# Patient Record
Sex: MALE | Race: WHITE | NOT HISPANIC OR LATINO
[De-identification: names, ages, dates, MRNs, and addresses within clinical notes are randomized per-mention and may not be internally consistent; named-entity substitution may affect disease eponyms.]

---

## 2019-07-26 PROBLEM — Z00.00 ENCOUNTER FOR PREVENTIVE HEALTH EXAMINATION: Status: ACTIVE | Noted: 2019-07-26

## 2019-09-10 ENCOUNTER — APPOINTMENT (OUTPATIENT)
Dept: NEPHROLOGY | Facility: CLINIC | Age: 74
End: 2019-09-10

## 2019-09-23 ENCOUNTER — APPOINTMENT (OUTPATIENT)
Dept: CARDIOLOGY | Facility: CLINIC | Age: 74
End: 2019-09-23
Payer: MEDICARE

## 2019-09-23 VITALS
SYSTOLIC BLOOD PRESSURE: 140 MMHG | HEIGHT: 72 IN | BODY MASS INDEX: 26.01 KG/M2 | DIASTOLIC BLOOD PRESSURE: 70 MMHG | OXYGEN SATURATION: 98 % | HEART RATE: 101 BPM | WEIGHT: 192 LBS

## 2019-09-23 DIAGNOSIS — I45.10 UNSPECIFIED RIGHT BUNDLE-BRANCH BLOCK: ICD-10-CM

## 2019-09-23 DIAGNOSIS — G43.909 MIGRAINE, UNSPECIFIED, NOT INTRACTABLE, W/OUT STATUS MIGRAINOSUS: ICD-10-CM

## 2019-09-23 DIAGNOSIS — Z86.79 PERSONAL HISTORY OF OTHER DISEASES OF THE CIRCULATORY SYSTEM: ICD-10-CM

## 2019-09-23 DIAGNOSIS — Z86.39 PERSONAL HISTORY OF OTHER ENDOCRINE, NUTRITIONAL AND METABOLIC DISEASE: ICD-10-CM

## 2019-09-23 DIAGNOSIS — Z87.09 PERSONAL HISTORY OF OTHER DISEASES OF THE RESPIRATORY SYSTEM: ICD-10-CM

## 2019-09-23 DIAGNOSIS — Z86.69 PERSONAL HISTORY OF OTHER DISEASES OF THE NERVOUS SYSTEM AND SENSE ORGANS: ICD-10-CM

## 2019-09-23 DIAGNOSIS — Z87.448 PERSONAL HISTORY OF OTHER DISEASES OF URINARY SYSTEM: ICD-10-CM

## 2019-09-23 PROCEDURE — 99205 OFFICE O/P NEW HI 60 MIN: CPT

## 2019-09-23 PROCEDURE — 93000 ELECTROCARDIOGRAM COMPLETE: CPT

## 2019-09-24 ENCOUNTER — NON-APPOINTMENT (OUTPATIENT)
Age: 74
End: 2019-09-24

## 2019-09-24 PROBLEM — I45.10 RIGHT BUNDLE BRANCH BLOCK: Status: RESOLVED | Noted: 2019-09-24 | Resolved: 2019-09-24

## 2019-09-24 PROBLEM — Z86.39 HISTORY OF DIABETES MELLITUS: Status: RESOLVED | Noted: 2019-09-24 | Resolved: 2019-09-24

## 2019-09-24 PROBLEM — Z87.448 HISTORY OF NEUROGENIC BLADDER: Status: RESOLVED | Noted: 2019-09-24 | Resolved: 2019-09-24

## 2019-09-24 PROBLEM — Z86.69 HISTORY OF SEIZURE DISORDER: Status: RESOLVED | Noted: 2019-09-24 | Resolved: 2019-09-24

## 2019-09-24 PROBLEM — Z87.09 HISTORY OF ASTHMA: Status: RESOLVED | Noted: 2019-09-24 | Resolved: 2019-09-24

## 2019-09-24 PROBLEM — G43.909 MIGRAINE HEADACHE: Status: RESOLVED | Noted: 2019-09-24 | Resolved: 2019-09-24

## 2019-09-24 PROBLEM — Z86.79 HISTORY OF HYPERTENSION: Status: RESOLVED | Noted: 2019-09-24 | Resolved: 2019-09-24

## 2019-09-24 NOTE — REVIEW OF SYSTEMS
[see HPI] : see HPI [Feeling Fatigued] : feeling fatigued [Chest Pain] : chest pain [Joint Pain] : joint pain [Anxiety] : anxiety [Negative] : Heme/Lymph

## 2019-09-29 RX ORDER — BUPROPION HYDROCHLORIDE 75 MG/1
TABLET, FILM COATED ORAL
Refills: 0 | Status: ACTIVE | COMMUNITY

## 2019-09-29 RX ORDER — CLONAZEPAM 2 MG/1
TABLET ORAL
Refills: 0 | Status: ACTIVE | COMMUNITY

## 2019-09-29 RX ORDER — SUMATRIPTAN SUCCINATE 100 MG/1
TABLET, FILM COATED ORAL
Refills: 0 | Status: ACTIVE | COMMUNITY

## 2019-09-29 NOTE — PHYSICAL EXAM
[Normal Conjunctiva] : the conjunctiva exhibited no abnormalities [Edema] : no peripheral edema present [Heart Sounds] : normal S1 and S2 [Auscultation Breath Sounds / Voice Sounds] : lungs were clear to auscultation bilaterally [Bowel Sounds] : normal bowel sounds [Abdomen Soft] : soft [Abnormal Walk] : normal gait [Abdomen Tenderness] : non-tender [] : no rash [Affect] : the affect was normal [FreeTextEntry1] : pleasant

## 2019-09-29 NOTE — DISCUSSION/SUMMARY
[FreeTextEntry1] : Chest pain\par The working diagnosis is musculoskeletal chest pain. The history is consistent with this diagnosis. The differential diagnosis would include myocardial ischemia/atherosclerotic heart disease. The patient has multiple risk factors for the development of atherosclerotic disease including diabetes age hyperlipidemia and hypertension. The resting 9/19 electrocardiogram is abnormal. Noninvasive evaluation for coronary disease is indicated. The patient's inability to exercise secondary to a gait disorder and peripheral neuropathy makes a provocative nuclear study most attractive.\par \par I have recommended the following:\par 1. Risk factor modification\par 2. Lexiscan sestamibi study\par \par Valvular heart disease\par The 8/19 echocardiogram was technically difficult. The heart appeared to be rotated laterally and posteriorly. Mild-moderate aortic stenosis was noted with a peak gradient of 18 mmHg , mean gradient of 10 mmHg and an aortic valve area of 1.0 cm². The mitral valve leaflets were thickened with trace mitral regurgitation. Mild pulmonary hypertension was noted with a pulmonary artery systolic pressure of 33-38 mmHg. Mild left ventricular diastolic dysfunction and left ventricular hypertrophy were seen. The left ventricular ejection fraction was 65-70%. The present cardiac physical examination is not suggestive of severe aortic stenosis. In view of the lack of symptoms, absence of heart failure and clinical course continued monitoring without intervention is indicated at this time.\par \par I have recommended the following:\par 1 no further cardiac testing for this problem at this time\par 2 repeat echocardiogram in one year\par \par \par \par Hypertension\par Hypertension is controlled on the present medical regimen. In the setting of diabetes ACE-I/ARB therapy is attractive. In view of previous development of severe hyponatremia attributed to diuretic administration., diuretics should be avoided. \par \par I have recommended the following: \par 1. Low-salt low-fat low-cholesterol diabetic diet. Exercise to the extent possible\par 2. Continue the present medical regimen\par \par \par Hyperlipidemia\par Hyperlipidemia represents a risk factor for the development of atherosclerotic heart disease. The target LDL level for primary prevention is about 100.  The most recent lipid levels are not available for review. Non-pharmacological therapy, specifically diet and exercise are emphasizes major aspects of treatment. \par \par I have recommended the following:\par 1. Low-salt low-fat low-cholesterol diabetic diet. Regular aerobic exercise to the extent possible\par 2. Target LDL level to about 100 as discussed above\par 3. Routine laboratory studies including lipid profile through primary care\par \par \par \par \par Right bundle branch block\par The presence of a right bundle branch block and left axis deviation are consistent with significant conduction system disease.  Permanent pacemaker implantation is not indicated in the absence of symptomatic bradycardia or high degree AV block\par \par I have  recommended the following:\par 1 no further cardiac testing for this problem at this time.

## 2019-09-29 NOTE — HISTORY OF PRESENT ILLNESS
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 There is no history of a myocardial infarction or congestive heart failure.\par \par Mathew complains of an anterior chest "twinge" which occurs at rest without associated diaphoresis nausea or dyspnea. His activity is largely limited by peripheral neuropathy attributed to long-standing diabetes. There is a prior history of hypertension.\par \par An 8/19 echocardiogram demonstrated mild aortic stenosis and trace mitral regurgitation. The left ventricular ejection fraction was 65-70%.\par \par Mr. Saavedra is requesting a referral for a primary care physician. I have recommended Dr. MELITON Grijalva\par \par Mathew presents today for cardiovascular evaluation.

## 2019-10-25 ENCOUNTER — APPOINTMENT (OUTPATIENT)
Dept: CARDIOLOGY | Facility: CLINIC | Age: 74
End: 2019-10-25
Payer: MEDICARE

## 2019-10-25 ENCOUNTER — NON-APPOINTMENT (OUTPATIENT)
Age: 74
End: 2019-10-25

## 2019-10-25 ENCOUNTER — RX RENEWAL (OUTPATIENT)
Age: 74
End: 2019-10-25

## 2019-10-25 VITALS
SYSTOLIC BLOOD PRESSURE: 190 MMHG | BODY MASS INDEX: 25.77 KG/M2 | HEART RATE: 81 BPM | DIASTOLIC BLOOD PRESSURE: 104 MMHG | OXYGEN SATURATION: 98 % | WEIGHT: 190 LBS

## 2019-10-25 DIAGNOSIS — Z82.3 FAMILY HISTORY OF STROKE: ICD-10-CM

## 2019-10-25 PROCEDURE — 93000 ELECTROCARDIOGRAM COMPLETE: CPT

## 2019-10-25 PROCEDURE — 99215 OFFICE O/P EST HI 40 MIN: CPT

## 2019-10-26 PROBLEM — Z82.3 FAMILY HISTORY OF CEREBROVASCULAR ACCIDENT (CVA): Status: ACTIVE | Noted: 2019-10-26

## 2019-10-26 LAB
ANION GAP SERPL CALC-SCNC: 17 MMOL/L
BASOPHILS # BLD AUTO: 0.1 K/UL
BASOPHILS NFR BLD AUTO: 1.3 %
BUN SERPL-MCNC: 20 MG/DL
CALCIUM SERPL-MCNC: 10.3 MG/DL
CHLORIDE SERPL-SCNC: 94 MMOL/L
CO2 SERPL-SCNC: 28 MMOL/L
CREAT SERPL-MCNC: 0.95 MG/DL
EOSINOPHIL # BLD AUTO: 0.23 K/UL
EOSINOPHIL NFR BLD AUTO: 3 %
GLUCOSE SERPL-MCNC: 115 MG/DL
HCT VFR BLD CALC: 42.9 %
HGB BLD-MCNC: 14.1 G/DL
IMM GRANULOCYTES NFR BLD AUTO: 0.3 %
LYMPHOCYTES # BLD AUTO: 1.35 K/UL
LYMPHOCYTES NFR BLD AUTO: 17.6 %
MAN DIFF?: NORMAL
MCHC RBC-ENTMCNC: 30.5 PG
MCHC RBC-ENTMCNC: 32.9 GM/DL
MCV RBC AUTO: 92.7 FL
MONOCYTES # BLD AUTO: 0.51 K/UL
MONOCYTES NFR BLD AUTO: 6.7 %
NEUTROPHILS # BLD AUTO: 5.44 K/UL
NEUTROPHILS NFR BLD AUTO: 71.1 %
PLATELET # BLD AUTO: 227 K/UL
POTASSIUM SERPL-SCNC: 4.9 MMOL/L
RBC # BLD: 4.63 M/UL
RBC # FLD: 12.1 %
SODIUM SERPL-SCNC: 138 MMOL/L
WBC # FLD AUTO: 7.65 K/UL

## 2019-10-26 RX ORDER — ATORVASTATIN CALCIUM 80 MG/1
TABLET, FILM COATED ORAL
Refills: 0 | Status: ACTIVE | COMMUNITY

## 2019-10-26 RX ORDER — OXCARBAZEPINE 150 MG/1
TABLET, FILM COATED ORAL
Refills: 0 | Status: ACTIVE | COMMUNITY
Start: 2019-08-27

## 2019-10-26 NOTE — REVIEW OF SYSTEMS
[Feeling Fatigued] : feeling fatigued [see HPI] : see HPI [Chest Pain] : chest pain [Joint Pain] : joint pain [Anxiety] : anxiety [Negative] : Heme/Lymph

## 2019-10-26 NOTE — DISCUSSION/SUMMARY
[FreeTextEntry1] : Chest pain\par The working diagnosis is musculoskeletal chest pain. The history is consistent with this diagnosis. The differential diagnosis would include myocardial ischemia/atherosclerotic heart disease. The patient has multiple risk factors for the development of atherosclerotic disease including diabetes age hyperlipidemia and hypertension. The resting  10/19 electrocardiogram is abnormal. Noninvasive evaluation for coronary disease is indicated. The patient's inability to exercise secondary to a gait disorder and peripheral neuropathy makes a provocative nuclear study most attractive.\par \par I have recommended the following:\par 1. Risk factor modification\par 2. Lexiscan sestamibi study\par \par \par \par Valvular heart disease\par The 8/19 echocardiogram was technically difficult. The heart appeared to be rotated laterally and posteriorly. Mild-moderate aortic stenosis was noted with a peak gradient of 18 mmHg , mean gradient of 10 mmHg and an aortic valve area of 1.0 cm². The mitral valve leaflets were thickened with trace mitral regurgitation. Mild pulmonary hypertension was noted with a pulmonary artery systolic pressure of 33-38 mmHg. Mild left ventricular diastolic dysfunction and left ventricular hypertrophy were seen. The left ventricular ejection fraction was 65-70%. The present cardiac physical examination is not suggestive of severe aortic stenosis. In view of the lack of symptoms, absence of heart failure and clinical course continued monitoring without intervention is indicated at this time.\par \par I have recommended the following:\par 1 no further cardiac testing for this problem at this time\par 2 repeat echocardiogram in one year\par \par \par \par Hypertension\par Hypertension is not  controlled on the present medical regimen. In the setting of diabetes ACE-I/ARB therapy is attractive. In view of previous development of severe hyponatremia attributed to diuretic administration., diuretics should be avoided. \par \par I have recommended the following: \par 1. Low-salt low-fat low-cholesterol diabetic diet. Exercise to the extent possible\par 2. Increase Benazepril  to 40 mg/day\par 3. add amlodipine 5 mg/day with further dose adjustment dictated by tolerance and response\par 4 home blood pressure monitoring\par 5. Neurology reevaluation Dr. Penaloza\par 6 laboratory studies including electrolytes and renal function are ordered\par 7 emergency room evaluation/medical records not available at this time requested for review\par \par Hyperlipidemia\par Hyperlipidemia represents a risk factor for the development of atherosclerotic heart disease. The target LDL level for primary prevention is about 100.  The most recent lipid levels are not available for review. Non-pharmacological therapy, specifically diet and exercise are emphasized as major aspects of treatment. \par \par I have recommended the following:\par 1. Low-salt low-fat low-cholesterol diabetic diet. Regular aerobic exercise to the extent possible\par 2. Target LDL level to about 100 as discussed above\par 3. Routine laboratory studies including lipid profile through primary care\par \par \par \par \par Right bundle branch block\par The presence of a right bundle branch block and left axis deviation are consistent with significant conduction system disease.  Permanent pacemaker implantation is not indicated in the absence of symptomatic bradycardia or high degree AV block\par \par I have  recommended the following:\par 1 no further cardiac testing for this problem at this time.

## 2019-10-26 NOTE — HISTORY OF PRESENT ILLNESS
[FreeTextEntry1] : 74-year-old man\par Unscheduled visit\par Mathew called with symptoms of a headache and elevated blood pressure levels to as high as 205/107. He was evaluated in the [facility] emergency room for similar complaints 2-3 days ago. CAT scan of the brain and blood tests were reportedly normal. The details/medical records are not available for review. Mr. Saavedra complains of chest discomfort described as a "twinge" at rest. In addition he states that he feels confused and forgetful.

## 2019-10-26 NOTE — PHYSICAL EXAM
[Normal Conjunctiva] : the conjunctiva exhibited no abnormalities [Auscultation Breath Sounds / Voice Sounds] : lungs were clear to auscultation bilaterally [Heart Sounds] : normal S1 and S2 [Edema] : no peripheral edema present [Bowel Sounds] : normal bowel sounds [Abdomen Soft] : soft [Abdomen Tenderness] : non-tender [Abnormal Walk] : normal gait [] : no rash [Affect] : the affect was normal [FreeTextEntry1] : pleasant

## 2019-11-08 ENCOUNTER — RESULT REVIEW (OUTPATIENT)
Age: 74
End: 2019-11-08

## 2019-11-13 ENCOUNTER — MEDICATION RENEWAL (OUTPATIENT)
Age: 74
End: 2019-11-13

## 2019-11-13 RX ORDER — CLONIDINE HYDROCHLORIDE 0.3 MG/1
TABLET ORAL
Refills: 0 | Status: DISCONTINUED | COMMUNITY
End: 2019-11-13

## 2019-12-27 ENCOUNTER — RX RENEWAL (OUTPATIENT)
Age: 74
End: 2019-12-27

## 2020-03-05 RX ORDER — BENAZEPRIL HYDROCHLORIDE 5 MG/1
TABLET ORAL
Refills: 0 | Status: DISCONTINUED | COMMUNITY
End: 2020-03-05

## 2020-06-19 ENCOUNTER — APPOINTMENT (OUTPATIENT)
Dept: CARDIOLOGY | Facility: CLINIC | Age: 75
End: 2020-06-19

## 2020-06-22 ENCOUNTER — NON-APPOINTMENT (OUTPATIENT)
Age: 75
End: 2020-06-22

## 2020-06-22 ENCOUNTER — APPOINTMENT (OUTPATIENT)
Dept: CARDIOLOGY | Facility: CLINIC | Age: 75
End: 2020-06-22
Payer: MEDICARE

## 2020-06-22 VITALS
OXYGEN SATURATION: 98 % | HEART RATE: 63 BPM | DIASTOLIC BLOOD PRESSURE: 90 MMHG | SYSTOLIC BLOOD PRESSURE: 142 MMHG | BODY MASS INDEX: 25.77 KG/M2 | WEIGHT: 190 LBS

## 2020-06-22 DIAGNOSIS — M50.90 CERVICAL DISC DISORDER, UNSPECIFIED, UNSPECIFIED CERVICAL REGION: ICD-10-CM

## 2020-06-22 DIAGNOSIS — Z86.59 PERSONAL HISTORY OF OTHER MENTAL AND BEHAVIORAL DISORDERS: ICD-10-CM

## 2020-06-22 DIAGNOSIS — Z87.09 PERSONAL HISTORY OF OTHER DISEASES OF THE RESPIRATORY SYSTEM: ICD-10-CM

## 2020-06-22 DIAGNOSIS — Z87.19 PERSONAL HISTORY OF OTHER DISEASES OF THE DIGESTIVE SYSTEM: ICD-10-CM

## 2020-06-22 DIAGNOSIS — Z63.5 DISRUPTION OF FAMILY BY SEPARATION AND DIVORCE: ICD-10-CM

## 2020-06-22 DIAGNOSIS — Z87.438 PERSONAL HISTORY OF OTHER DISEASES OF MALE GENITAL ORGANS: ICD-10-CM

## 2020-06-22 PROCEDURE — 99215 OFFICE O/P EST HI 40 MIN: CPT

## 2020-06-22 PROCEDURE — 93000 ELECTROCARDIOGRAM COMPLETE: CPT

## 2020-06-22 SDOH — SOCIAL STABILITY - SOCIAL INSECURITY: DISRUPTION OF FAMILY BY SEPARATION AND DIVORCE: Z63.5

## 2020-06-23 PROBLEM — Z87.09 HISTORY OF SINUSITIS: Status: RESOLVED | Noted: 2020-06-23 | Resolved: 2020-06-23

## 2020-06-23 PROBLEM — Z87.19 HISTORY OF HIATAL HERNIA: Status: RESOLVED | Noted: 2020-06-23 | Resolved: 2020-06-23

## 2020-06-23 PROBLEM — Z87.438 HISTORY OF BENIGN PROSTATIC HYPERPLASIA: Status: RESOLVED | Noted: 2019-09-24 | Resolved: 2020-06-23

## 2020-06-23 PROBLEM — Z63.5 DIVORCED: Status: ACTIVE | Noted: 2020-06-23

## 2020-06-23 PROBLEM — Z87.19 HISTORY OF BARRETT'S ESOPHAGUS: Status: RESOLVED | Noted: 2019-09-24 | Resolved: 2020-06-23

## 2020-06-23 PROBLEM — Z86.59 HISTORY OF DEPRESSION: Status: RESOLVED | Noted: 2019-09-24 | Resolved: 2020-06-23

## 2020-06-23 PROBLEM — M50.90 CERVICAL DISC DISEASE: Status: RESOLVED | Noted: 2020-06-23 | Resolved: 2020-06-23

## 2020-06-25 NOTE — PHYSICAL EXAM
[Normal Conjunctiva] : the conjunctiva exhibited no abnormalities [Auscultation Breath Sounds / Voice Sounds] : lungs were clear to auscultation bilaterally [Edema] : no peripheral edema present [Heart Sounds] : normal S1 and S2 [Abdomen Soft] : soft [Bowel Sounds] : normal bowel sounds [Abdomen Tenderness] : non-tender [Abnormal Walk] : normal gait [] : no rash [Affect] : the affect was normal [FreeTextEntry1] : pleasant

## 2020-06-25 NOTE — HISTORY OF PRESENT ILLNESS
[FreeTextEntry1] : 74-year-old man\par Unscheduled visit\par Mathew reports an episode of severe lightheadedness one day after swimming in cold water one week ago. In association with this he felt an irregular pulse. No chest pain. No shortness of breath. No syncope. There has been no recurrence. At the present time he feels well.

## 2020-06-25 NOTE — DISCUSSION/SUMMARY
[FreeTextEntry1] : Palpitations/dizziness\par The working diagnosis is atrial ectopy. The history is consistent with this diagnosis. The differential diagnoses would include a  nonsustained atrial arrhythmia. In the setting of normal left ventricular systolic function (8/19 echocardiogram left ventricular ejection fraction 65-70%) the risk for development of malignant ventricular arrhythmia is low. An electrocardiogram during symptoms will be most helpful for diagnosis. The degree of aortic stenosis does not appear to be severe enough to account for the patient's symptoms\par \par \par I have recommended the following:\par 1.Zio patch 2  week event recorder\par 2 echocardiogram\par \par \par \par \par Valvular heart  disease\par The 8/19 echocardiogram was technically difficult. The heart appeared to be rotated laterally and posteriorly. Mild-moderate aortic stenosis was noted with a peak gradient of 18 mmHg , mean gradient of 10 mmHg and an aortic valve area of 1.0 cm². The mitral valve leaflets were thickened with trace mitral regurgitation. Mild pulmonary hypertension was noted with a pulmonary artery systolic pressure of 33-38 mmHg. Mild left ventricular diastolic dysfunction and left ventricular hypertrophy were seen. The left ventricular ejection fraction was 65-70%. The present cardiac physical examination is not suggestive of severe aortic stenosis.Echocardiography would be helpful to reassess left ventricular and valvular function..\par \par I have recommended the following:\par 1  echocardiogram\par \par \par \par Hypertension\par Hypertension is  reportedly been controlled on the present medical regimen.Elevation of the blood pressure on initial examination today ( 142/90) may in part be related to a white coat effect. In the setting of diabetes ACE-I/ARB therapy is attractive. In view of previous development of severe hyponatremia attributed to diuretic administration., diuretics should be avoided. \par \par I have recommended the following: \par 1. Low-salt low-fat low-cholesterol diabetic diet. Exercise to the extent possible\par 2. Continue the present medical regimen\par 3 home blood pressure monitoring\par \par \par \par Hyperlipidemia\par Hyperlipidemia represents a risk factor for the development of atherosclerotic heart disease. There is however no evidence of such to date. An  11/19 lexiscan sestamibi study was normal. The left ventricular ejection fraction was 63% The target LDL level for primary prevention is about 100.  The most recent lipid levels are not available for review. Non-pharmacological therapy, specifically diet and exercise are emphasized as major aspects of treatment. \par \par I have recommended the following:\par 1. Low-salt low-fat low-cholesterol diabetic diet. Regular aerobic exercise to the extent possible\par 2. Target LDL level to about 100 as discussed above\par 3. Routine laboratory studies including lipid profile through primary care Dr. Grijalva\par \par \par \par \par Right bundle branch block\par The presence of a right bundle branch block and left axis deviation are consistent with significant conduction system disease.  Permanent pacemaker implantation is not indicated in the absence of symptomatic bradycardia or high degree AV block\par \par I have  recommended the following:\par 1 no further cardiac testing for this problem at this time.\par \par \par \par The diagnosis, prognosis, risks, options and alternatives were explained at length to the patient. All questions were answered. Issues discussed included hypertension hyperlipidemia dizziness rhythm disturbances atherosclerotic heart disease valvular heart disease and in particular aortic valve stenosis.\par \par Counseling and/or coordination of care\par Time was a significant factor for this patient encounter. Total time spent with the patient was 40 minutes. 50% of the time was devoted to counseling and/or coordination of care

## 2020-07-02 ENCOUNTER — APPOINTMENT (OUTPATIENT)
Dept: CARDIOLOGY | Facility: CLINIC | Age: 75
End: 2020-07-02
Payer: MEDICARE

## 2020-07-02 PROCEDURE — 0296T: CPT

## 2020-07-14 ENCOUNTER — RESULT REVIEW (OUTPATIENT)
Age: 75
End: 2020-07-14

## 2020-08-03 PROCEDURE — 93227 XTRNL ECG REC<48 HR R&I: CPT

## 2020-08-04 ENCOUNTER — APPOINTMENT (OUTPATIENT)
Dept: CARDIOLOGY | Facility: CLINIC | Age: 75
End: 2020-08-04

## 2020-08-10 ENCOUNTER — APPOINTMENT (OUTPATIENT)
Dept: CARDIOLOGY | Facility: CLINIC | Age: 75
End: 2020-08-10

## 2020-09-19 ENCOUNTER — TRANSCRIPTION ENCOUNTER (OUTPATIENT)
Age: 75
End: 2020-09-19

## 2020-11-02 ENCOUNTER — RX RENEWAL (OUTPATIENT)
Age: 75
End: 2020-11-02

## 2020-11-04 ENCOUNTER — RX RENEWAL (OUTPATIENT)
Age: 75
End: 2020-11-04

## 2020-12-07 ENCOUNTER — APPOINTMENT (OUTPATIENT)
Dept: CARDIOLOGY | Facility: CLINIC | Age: 75
End: 2020-12-07
Payer: MEDICARE

## 2020-12-07 PROCEDURE — 93306 TTE W/DOPPLER COMPLETE: CPT

## 2020-12-17 ENCOUNTER — RX RENEWAL (OUTPATIENT)
Age: 75
End: 2020-12-17

## 2021-06-10 ENCOUNTER — APPOINTMENT (OUTPATIENT)
Dept: HEMATOLOGY ONCOLOGY | Facility: CLINIC | Age: 76
End: 2021-06-10
Payer: MEDICARE

## 2021-06-10 VITALS
DIASTOLIC BLOOD PRESSURE: 78 MMHG | WEIGHT: 184 LBS | HEIGHT: 72 IN | RESPIRATION RATE: 18 BRPM | TEMPERATURE: 98.2 F | HEART RATE: 73 BPM | BODY MASS INDEX: 24.92 KG/M2 | SYSTOLIC BLOOD PRESSURE: 160 MMHG | OXYGEN SATURATION: 97 %

## 2021-06-10 DIAGNOSIS — Z86.69 PERSONAL HISTORY OF OTHER DISEASES OF THE NERVOUS SYSTEM AND SENSE ORGANS: ICD-10-CM

## 2021-06-10 DIAGNOSIS — Z80.8 FAMILY HISTORY OF MALIGNANT NEOPLASM OF OTHER ORGANS OR SYSTEMS: ICD-10-CM

## 2021-06-10 PROCEDURE — 99205 OFFICE O/P NEW HI 60 MIN: CPT

## 2021-06-10 RX ORDER — CLONIDINE HYDROCHLORIDE 0.1 MG/1
0.1 TABLET ORAL DAILY
Qty: 90 | Refills: 0 | Status: DISCONTINUED | COMMUNITY
Start: 2019-11-13 | End: 2021-06-10

## 2021-07-01 LAB
ALBUMIN MFR SERPL ELPH: 66.8 %
ALBUMIN SERPL ELPH-MCNC: 4.8 G/DL
ALBUMIN SERPL-MCNC: 4.4 G/DL
ALBUMIN/GLOB SERPL: 2 RATIO
ALP BLD-CCNC: 93 U/L
ALPHA1 GLOB MFR SERPL ELPH: 3.9 %
ALPHA1 GLOB SERPL ELPH-MCNC: 0.3 G/DL
ALPHA2 GLOB MFR SERPL ELPH: 9.3 %
ALPHA2 GLOB SERPL ELPH-MCNC: 0.6 G/DL
ALT SERPL-CCNC: 29 U/L
ANA SER IF-ACNC: NEGATIVE
ANION GAP SERPL CALC-SCNC: 19 MMOL/L
AST SERPL-CCNC: 33 U/L
B-GLOBULIN MFR SERPL ELPH: 10.3 %
B-GLOBULIN SERPL ELPH-MCNC: 0.7 G/DL
BILIRUB SERPL-MCNC: 0.3 MG/DL
BUN SERPL-MCNC: 27 MG/DL
CALCIUM SERPL-MCNC: 9.7 MG/DL
CHLORIDE SERPL-SCNC: 97 MMOL/L
CO2 SERPL-SCNC: 26 MMOL/L
CREAT SERPL-MCNC: 1.05 MG/DL
DEPRECATED KAPPA LC FREE/LAMBDA SER: 1.36 RATIO
DEPRECATED KAPPA LC FREE/LAMBDA SER: 1.36 RATIO
EPO SERPL-MCNC: 12.7 MIU/ML
FERRITIN SERPL-MCNC: 109 NG/ML
FOLATE SERPL-MCNC: >20 NG/ML
GAMMA GLOB FLD ELPH-MCNC: 0.6 G/DL
GAMMA GLOB MFR SERPL ELPH: 9.7 %
GLUCOSE SERPL-MCNC: 169 MG/DL
HAPTOGLOB SERPL-MCNC: 96 MG/DL
IGA SER QL IEP: 176 MG/DL
IGG SER QL IEP: 616 MG/DL
IGM SER QL IEP: 71 MG/DL
INTERPRETATION SERPL IEP-IMP: NORMAL
IRON SATN MFR SERPL: 27 %
IRON SERPL-MCNC: 74 UG/DL
KAPPA LC CSF-MCNC: 1.75 MG/DL
KAPPA LC CSF-MCNC: 1.75 MG/DL
KAPPA LC SERPL-MCNC: 2.38 MG/DL
KAPPA LC SERPL-MCNC: 2.38 MG/DL
LDH SERPL-CCNC: 242 U/L
M PROTEIN SPEC IFE-MCNC: NORMAL
MAGNESIUM SERPL-MCNC: 1.8 MG/DL
METHYLMALONATE SERPL-SCNC: 266 NMOL/L
PHOSPHATE SERPL-MCNC: 3.1 MG/DL
POTASSIUM SERPL-SCNC: 5.2 MMOL/L
PROT SERPL-MCNC: 6.6 G/DL
RHEUMATOID FACT SER QL: 14 IU/ML
SODIUM SERPL-SCNC: 142 MMOL/L
TIBC SERPL-MCNC: 272 UG/DL
TSH SERPL-ACNC: 2.35 UIU/ML
UIBC SERPL-MCNC: 198 UG/DL
VIT B12 SERPL-MCNC: 772 PG/ML

## 2021-07-01 NOTE — HISTORY OF PRESENT ILLNESS
[de-identified] : Mr. Saavedra is referred  by Dr.Jack Álvarez for second opinion for history of anemia.\par He reports having longstanding history of iron deficiency anemia which he states is an issue even when he his irons are normal.  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  He has undergone GI evaluation with endoscopy, capsule endoscopy and colonoscopy which failed to show areas of bleeding.  Most recent CBC on 4/5/2021 reveals a hemoglobin 11.7 hematocrit of 36.7 MCV of 95.8 and MCH of 30.5.  His ferritin was 161 and percent saturation was 31.  He reports feeling weak and tired.  He states that in the past he underwent bone marrow biopsy however the results were not available at this time\par h/o low iron received IV iron- loses iron replaced then shoots up \par pt of  (didn't ask questions about why iron drops)? 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 beta lymphocutosis\par BM biopsy by  "marrow like [place]." [0 - No Distress] : Distress Level: 0

## 2021-07-01 NOTE — REVIEW OF SYSTEMS
[Fatigue] : fatigue [Palpitations] : palpitations [Difficulty Walking] : difficulty walking [Negative] : Allergic/Immunologic [de-identified] : Peripheral neuropathy, tremors

## 2021-07-01 NOTE — REASON FOR VISIT
[Initial Consultation] : an initial consultation for [FreeTextEntry2] : Anemia, monoclonal B-cell lymphocytosis

## 2021-07-01 NOTE — ASSESSMENT
[FreeTextEntry1] : This appears to be a relatively chronic and progressive finding. \par I will obtain a CBC with differential and a peripheral blood smear review. I will obtain a complete hematological workup to include a CMP, LDH, haptoglobin, reticulocyte count, B12/MMA/folic acid levels, TSH, SPEP, SIEP, IgQ,serum free light chains, HERNANDEZ,  RF, panel, ferritin level, and a Digna' test. \par The patient will return in 4 weeks for followup, review of the above workup, and further recommendations.  \par \par Thank you very much for allowing me to participate in the care of this patient. Should you have any questions or concerns please do not hesitate to call me directly.

## 2021-07-14 ENCOUNTER — NON-APPOINTMENT (OUTPATIENT)
Age: 76
End: 2021-07-14

## 2021-07-14 ENCOUNTER — APPOINTMENT (OUTPATIENT)
Dept: HEMATOLOGY ONCOLOGY | Facility: CLINIC | Age: 76
End: 2021-07-14

## 2021-07-27 ENCOUNTER — APPOINTMENT (OUTPATIENT)
Dept: HEMATOLOGY ONCOLOGY | Facility: CLINIC | Age: 76
End: 2021-07-27

## 2022-09-07 ENCOUNTER — NON-APPOINTMENT (OUTPATIENT)
Age: 77
End: 2022-09-07

## 2022-09-21 ENCOUNTER — NON-APPOINTMENT (OUTPATIENT)
Age: 77
End: 2022-09-21

## 2022-09-21 ENCOUNTER — APPOINTMENT (OUTPATIENT)
Dept: CARDIOLOGY | Facility: CLINIC | Age: 77
End: 2022-09-21

## 2022-09-21 VITALS
WEIGHT: 177 LBS | HEART RATE: 67 BPM | DIASTOLIC BLOOD PRESSURE: 88 MMHG | HEIGHT: 72 IN | OXYGEN SATURATION: 99 % | SYSTOLIC BLOOD PRESSURE: 198 MMHG | BODY MASS INDEX: 23.98 KG/M2

## 2022-09-21 DIAGNOSIS — G61.81 CHRONIC INFLAMMATORY DEMYELINATING POLYNEURITIS: ICD-10-CM

## 2022-09-21 DIAGNOSIS — Z86.59 PERSONAL HISTORY OF OTHER MENTAL AND BEHAVIORAL DISORDERS: ICD-10-CM

## 2022-09-21 DIAGNOSIS — Z86.69 PERSONAL HISTORY OF OTHER DISEASES OF THE NERVOUS SYSTEM AND SENSE ORGANS: ICD-10-CM

## 2022-09-21 DIAGNOSIS — Z86.39 PERSONAL HISTORY OF OTHER ENDOCRINE, NUTRITIONAL AND METABOLIC DISEASE: ICD-10-CM

## 2022-09-21 PROCEDURE — 93000 ELECTROCARDIOGRAM COMPLETE: CPT

## 2022-09-21 PROCEDURE — 99215 OFFICE O/P EST HI 40 MIN: CPT

## 2022-09-21 PROCEDURE — 36415 COLL VENOUS BLD VENIPUNCTURE: CPT

## 2022-09-25 PROBLEM — Z86.39 HISTORY OF HYPERLIPIDEMIA: Status: RESOLVED | Noted: 2019-09-24 | Resolved: 2022-09-25

## 2022-09-25 RX ORDER — OMEPRAZOLE 40 MG/1
CAPSULE, DELAYED RELEASE ORAL
Refills: 0 | Status: ACTIVE | COMMUNITY

## 2022-09-25 RX ORDER — GABAPENTIN 100 MG/1
CAPSULE ORAL
Refills: 0 | Status: ACTIVE | COMMUNITY
Start: 2019-08-05

## 2022-09-25 RX ORDER — LAMOTRIGINE 150 MG/1
TABLET ORAL
Refills: 0 | Status: ACTIVE | COMMUNITY

## 2022-09-25 RX ORDER — CLONIDINE HYDROCHLORIDE 0.3 MG/1
TABLET ORAL
Refills: 0 | Status: ACTIVE | COMMUNITY

## 2022-09-28 LAB
ANION GAP SERPL CALC-SCNC: 10 MMOL/L
BASOPHILS # BLD AUTO: 0.08 K/UL
BASOPHILS NFR BLD AUTO: 1.9 %
BUN SERPL-MCNC: 19 MG/DL
CALCIUM SERPL-MCNC: 9.7 MG/DL
CHLORIDE SERPL-SCNC: 100 MMOL/L
CHOLEST SERPL-MCNC: 188 MG/DL
CO2 SERPL-SCNC: 30 MMOL/L
CREAT SERPL-MCNC: 1.03 MG/DL
EGFR: 75 ML/MIN/1.73M2
EOSINOPHIL # BLD AUTO: 0.18 K/UL
EOSINOPHIL NFR BLD AUTO: 4.2 %
ESTIMATED AVERAGE GLUCOSE: 123 MG/DL
GLUCOSE SERPL-MCNC: 163 MG/DL
HBA1C MFR BLD HPLC: 5.9 %
HCT VFR BLD CALC: 39.7 %
HDLC SERPL-MCNC: 97 MG/DL
HGB BLD-MCNC: 12 G/DL
IMM GRANULOCYTES NFR BLD AUTO: 0.2 %
LDLC SERPL CALC-MCNC: 78 MG/DL
LYMPHOCYTES # BLD AUTO: 0.97 K/UL
LYMPHOCYTES NFR BLD AUTO: 22.9 %
MAN DIFF?: NORMAL
MCHC RBC-ENTMCNC: 27.9 PG
MCHC RBC-ENTMCNC: 30.2 GM/DL
MCV RBC AUTO: 92.3 FL
MONOCYTES # BLD AUTO: 0.33 K/UL
MONOCYTES NFR BLD AUTO: 7.8 %
NEUTROPHILS # BLD AUTO: 2.67 K/UL
NEUTROPHILS NFR BLD AUTO: 63 %
NONHDLC SERPL-MCNC: 91 MG/DL
PLATELET # BLD AUTO: 209 K/UL
POTASSIUM SERPL-SCNC: 5.4 MMOL/L
RBC # BLD: 4.3 M/UL
RBC # FLD: 18.2 %
SODIUM SERPL-SCNC: 139 MMOL/L
TRIGL SERPL-MCNC: 63 MG/DL
WBC # FLD AUTO: 4.24 K/UL

## 2022-09-28 NOTE — REVIEW OF SYSTEMS
[Feeling Fatigued] : feeling fatigued [Joint Pain] : joint pain [Anxiety] : anxiety [Negative] : Heme/Lymph [FreeTextEntry5] : see history of present illness

## 2022-09-28 NOTE — DISCUSSION/SUMMARY
[FreeTextEntry1] : Dizziness/palpitations\par The working diagnosis is atrial ectopy. The history is consistent with this diagnosis. The differential diagnoses would include a  nonsustained atrial arrhythmia. A.7/20 Zio patch 2 day mobile telemetry study reveals sinus rhythm /minute. No arrhythmia was seen.  In the setting of normal left ventricular systolic function (6/21  echocardiogram left ventricular ejection fraction  55-60%) the risk for development of malignant ventricular arrhythmia is low. An electrocardiogram during symptoms will be most helpful for diagnosis. The degree of aortic stenosis does not appear to be severe enough to account for the patient's symptoms\par \par \par I have recommended the following:\par 1. echocardiogram\par \par \par \par \par Atherosclerotic heart disease\par Atherosclerotic heart disease is stable. In 11/19 Lexiscan sestamibi study was normal. The left ventricle ejection fraction was 63%. In 6/21 he was hospitalized following a mechanical fall and had to have elevated troponin level  30 - with questionable T-wave inversions in the lateral leads. He was diagnosed as having a non-STEMI. It is unclear what followup studies if any were done after that hospitalization.\par \par I have recommended the following\par a. Prior cardiac records not available at this time are requested for review\par b. Lexiscan sestamibi study\par \par \par \par Valvular heart  disease\par The 6/21 echocardiogram revealed mild aortic stenosis. The peak gradient was 12 mmHg mean gradient 7 mmHg. Aortic valve area 1.2 cm². Moderate tricuspid regurgitation was seen Trace mitral regurgitation was noted. Mild pulmonary hypertension was reflected by a pulmonary artery  systolic pressure of 42 mmHg. The left ventricular ejection fraction was 55-60%.\par . The present cardiac physical examination is not suggestive of severe aortic stenosis.Echocardiography would be helpful to reassess left ventricular and valvular function..\par \par I have recommended the following:\par 1  echocardiogram\par \par \par \par Hypertension\par Hypertension is  not  controlled on the present medical regimen.Elevation of the blood pressure on initial examination today ( 198/88 mmHg ) may in part be related to a white coat effect.  In view of previous development of severe hyponatremia attributed to diuretic administration., diuretics should be avoided.   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  Previous treatment with benazepril and amlodipine were discontinued for unclear reasons\par \par I have recommended the following: \par 1. Low-salt low-fat low-cholesterol diabetic diet. Exercise to the extent possible\par 2. Increase clonidine dose to 0.1 mg t.i.d. and prn with consideration for clonidine 0.2 mg b.i.d.\par 3. increase carvedilol dose from 3.125 minutes b.i.d. to 6.25 mg b.i.d. and further dose adjustment dictated by tolerance and response\par 4 home blood pressure monitoring\par 5. Further medication adjustment dictated by patient's clinical course and response to above measures\par \par \par \par Hyperlipidemia\par Hyperlipidemia represents a risk factor for  progressive atherosclerotic heart disease.   The patient carries a diagnoses of atherosclerotic heart disease as such the target LDL levels about 70.  HMG coA reductase inhibitor therapy has been effective  In 9/22 the serum cholesterol level was 188 triglycerides 63 HDL 97 and LDL 78 . Non-pharmacological therapy, specifically diet and exercise are emphasized as major aspects of treatment. \par \par I have recommended the following:\par 1. Low-salt low-fat low-cholesterol diabetic diet. Regular aerobic exercise to the extent possible\par 2. Target LDL level to about 70  as discussed above\par 3. Continue the present medical regimen\par \par \par \par \par Right bundle branch block\par The presence of a right bundle branch block and left axis deviation are consistent with significant conduction system disease.  Permanent pacemaker implantation is not indicated in the absence of symptomatic bradycardia or high degree AV block\par \par I have  recommended the following:\par 1 no further cardiac testing for this problem at this time.\par \par \par \par The diagnosis, prognosis, risks, options and alternatives were explained at length to the patient. All questions were answered. Issues discussed included hypertension hyperlipidemia dizziness rhythm disturbances atherosclerotic heart disease valvular heart disease and in particular aortic valve stenosis.\par \par Counseling and/or coordination of care\par Time was a significant factor for this patient encounter. Total time spent with the patient was 40 minutes. 50% of the time was devoted to counseling and/or coordination of care

## 2022-09-28 NOTE — HISTORY OF PRESENT ILLNESS
[FreeTextEntry1] : 76-year-old man\par Unanticipated visit\par Mathew called with elevated blood pressure despite the present medical regimen. clonidine provides benefit taken intermittently   for  blood pressure  "spikes"  Symptoms of dizziness are not progressive or severe. No syncope.

## 2022-10-11 ENCOUNTER — APPOINTMENT (OUTPATIENT)
Dept: CARDIOLOGY | Facility: CLINIC | Age: 77
End: 2022-10-11

## 2022-10-11 PROCEDURE — 93306 TTE W/DOPPLER COMPLETE: CPT

## 2022-10-18 ENCOUNTER — APPOINTMENT (OUTPATIENT)
Dept: CARDIOLOGY | Facility: CLINIC | Age: 77
End: 2022-10-18

## 2022-10-18 VITALS
HEART RATE: 66 BPM | HEIGHT: 72 IN | BODY MASS INDEX: 24.38 KG/M2 | SYSTOLIC BLOOD PRESSURE: 163 MMHG | WEIGHT: 180 LBS | OXYGEN SATURATION: 98 % | DIASTOLIC BLOOD PRESSURE: 67 MMHG

## 2022-10-18 DIAGNOSIS — Z86.2 PERSONAL HISTORY OF DISEASES OF THE BLOOD AND BLOOD-FORMING ORGANS AND CERTAIN DISORDERS INVOLVING THE IMMUNE MECHANISM: ICD-10-CM

## 2022-10-18 PROCEDURE — 99215 OFFICE O/P EST HI 40 MIN: CPT

## 2022-10-18 RX ORDER — DOXAZOSIN 2 MG/1
2 TABLET ORAL
Qty: 180 | Refills: 3 | Status: ACTIVE | COMMUNITY
Start: 2022-10-18 | End: 1900-01-01

## 2022-10-21 PROBLEM — Z86.2: Status: RESOLVED | Noted: 2019-09-24 | Resolved: 2022-10-21

## 2022-10-21 NOTE — REVIEW OF SYSTEMS
[Feeling Fatigued] : feeling fatigued [Joint Pain] : joint pain [Dizziness] : dizziness [Anxiety] : anxiety [Negative] : Integumentary [FreeTextEntry5] : see history of present illness [FreeTextEntry3] : glasses

## 2022-10-21 NOTE — DISCUSSION/SUMMARY
[FreeTextEntry1] : Atherosclerotic heart disease\par Atherosclerotic heart disease is stable. In 11/19 Lexiscan sestamibi study was normal. The left ventricle ejection fraction was 63%. In 6/21 he was hospitalized following a mechanical fall and had to have elevated troponin level  30 - with questionable T-wave inversions in the lateral leads. He was diagnosed as having a non-STEMI. It is unclear what followup studies if any were done after that hospitalization.\par \par I have recommended the following\par a. Prior cardiac records not available at this time are requested for review\par b. Lexiscan sestamibi study\par \par \par \par Valvular heart  disease\par The 10/22  echocardiogram revealed mild -moderate  aortic stenosis. The peak gradient was 18 mmHg mean gradient 10  mmHg. Aortic valve area 1.1 cm²  Aortic VTI ratio 0.27 . Moderate tricuspid regurgitation was seen Mild  mitral regurgitation was noted. Mild pulmonary hypertension was reflected by a pulmonary artery  systolic pressure of 48  mmHg. The left ventricular ejection fraction was 56%.\par . The present cardiac physical examination is not suggestive of severe aortic stenosis and  is consistent with the absence of heart failure..\par \par I have recommended the following:\par 1  No further cardiac testing for this problem at this time\par 2. Anticipate repeat echocardiogram in 6-12 months\par \par \par \par \par Hypertension\par Hypertension is  not  controlled on the present medical regimen.  The pressure levels have been severely labile   .Elevation of the blood pressure on initial examination today ( 163/67 mmHg ) may in part be related to a white coat effect.  In view of previous development of severe hyponatremia attributed to diuretic administration., diuretics should be avoided.   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  Previous treatment with benazepril and amlodipine were discontinued for unclear reasons\par \par I have recommended the following: \par 1. Low-salt low-fat low-cholesterol diabetic diet. Exercise to the extent possible\par 2.  increase carvedilol dose from  6.25 mg b.i.d.   to 12.5 mg bid and further dose adjustment dictated by tolerance and response\par 3. doxazosin  2 mg b.i.d.to be added to the present medical regimen with further dose adjustment dictated by tolerance and response\par 4 home blood pressure monitoring\par 5. Further medication adjustment dictated by patient's clinical course and response to above measures\par 6  Nephrology/hypertension consultation Dr. MELITON Soliman.\par \par \par \par Hyperlipidemia\par Hyperlipidemia represents a risk factor for  progressive atherosclerotic heart disease.   The patient carries a diagnoses of atherosclerotic heart disease as such the target LDL levels about 70.  HMG coA reductase inhibitor therapy has been effective  In 9/22 the serum cholesterol level was 188 triglycerides 63 HDL 97 and LDL 78 . Non-pharmacological therapy, specifically diet and exercise are emphasized as major aspects of treatment. \par \par I have recommended the following:\par 1. Low-salt low-fat low-cholesterol diabetic diet. Regular aerobic exercise to the extent possible\par 2. Target LDL level to about 70  as discussed above\par 3. Continue the present medical regimen\par \par \par \par \par Right bundle branch block\par The presence of a right bundle branch block and left axis deviation are consistent with significant conduction system disease.  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 The history is consistent with this diagnosis. The differential diagnoses would include a  nonsustained atrial arrhythmia. A.7/20 Zio patch 2 day mobile telemetry study reveals sinus rhythm /minute. No arrhythmia was seen.  In the setting of normal left ventricular systolic function (6/21  echocardiogram left ventricular ejection fraction  55-60%) the risk for development of malignant ventricular arrhythmia is low. An electrocardiogram during symptoms will be most helpful for diagnosis. The degree of aortic stenosis does not appear to be severe enough to account for the patient's symptoms\par \par I have recommended the following\par a. No further cardiac testing for this problem at this time\par \par \par \par \par \par \par The diagnosis, prognosis, risks, options and alternatives were explained at length to the patient. All questions were answered. Issues discussed included hypertension hyperlipidemia dizziness rhythm disturbances atherosclerotic heart disease valvular heart disease and in particular aortic valve stenosis.\par \par Counseling and/or coordination of care\par Time was a significant factor for this patient encounter. Total time spent with the patient was 40 minutes. 50% of the time was devoted to counseling and/or coordination of care

## 2022-10-21 NOTE — HISTORY OF PRESENT ILLNESS
[FreeTextEntry1] : 77 year old man\par routine follow up\par gayle complains bitterly of labile blood pressure levels.  recent systolic pressure can spike to greater than 200 mmHg systolic. he reports having a "state of the art" home blood pressure monitor.\par no chest pain. no dyspnea at rest. no syncope.\par \suresh araujo is accompanied today by his aide, nigel

## 2022-11-10 ENCOUNTER — APPOINTMENT (OUTPATIENT)
Dept: NEPHROLOGY | Facility: CLINIC | Age: 77
End: 2022-11-10

## 2022-11-10 ENCOUNTER — RESULT REVIEW (OUTPATIENT)
Age: 77
End: 2022-11-10

## 2022-11-10 VITALS
SYSTOLIC BLOOD PRESSURE: 176 MMHG | HEART RATE: 63 BPM | OXYGEN SATURATION: 98 % | BODY MASS INDEX: 24.52 KG/M2 | DIASTOLIC BLOOD PRESSURE: 80 MMHG | HEIGHT: 72 IN | TEMPERATURE: 95.3 F | WEIGHT: 181 LBS

## 2022-11-10 DIAGNOSIS — E11.9 TYPE 2 DIABETES MELLITUS W/OUT COMPLICATIONS: ICD-10-CM

## 2022-11-10 PROCEDURE — 99204 OFFICE O/P NEW MOD 45 MIN: CPT

## 2022-11-10 RX ORDER — CARVEDILOL 3.12 MG/1
TABLET, FILM COATED ORAL
Refills: 0 | Status: DISCONTINUED | COMMUNITY
End: 2022-11-10

## 2022-11-11 PROBLEM — E11.9 DIABETES: Status: ACTIVE | Noted: 2022-09-21

## 2022-11-12 ENCOUNTER — NON-APPOINTMENT (OUTPATIENT)
Age: 77
End: 2022-11-12

## 2022-11-18 ENCOUNTER — RESULT REVIEW (OUTPATIENT)
Age: 77
End: 2022-11-18

## 2022-11-29 ENCOUNTER — RESULT REVIEW (OUTPATIENT)
Age: 77
End: 2022-11-29

## 2022-11-29 ENCOUNTER — APPOINTMENT (OUTPATIENT)
Dept: NEPHROLOGY | Facility: CLINIC | Age: 77
End: 2022-11-29

## 2022-11-29 ENCOUNTER — TRANSCRIPTION ENCOUNTER (OUTPATIENT)
Age: 77
End: 2022-11-29

## 2022-11-29 PROCEDURE — 36415 COLL VENOUS BLD VENIPUNCTURE: CPT

## 2022-11-29 PROCEDURE — P9615: CPT

## 2022-11-30 ENCOUNTER — APPOINTMENT (OUTPATIENT)
Dept: CARDIOLOGY | Facility: CLINIC | Age: 77
End: 2022-11-30

## 2022-11-30 VITALS
OXYGEN SATURATION: 98 % | HEART RATE: 94 BPM | WEIGHT: 182 LBS | HEIGHT: 72 IN | DIASTOLIC BLOOD PRESSURE: 74 MMHG | SYSTOLIC BLOOD PRESSURE: 162 MMHG | BODY MASS INDEX: 24.65 KG/M2

## 2022-11-30 DIAGNOSIS — Z87.898 PERSONAL HISTORY OF OTHER SPECIFIED CONDITIONS: ICD-10-CM

## 2022-11-30 PROCEDURE — 99215 OFFICE O/P EST HI 40 MIN: CPT

## 2022-11-30 NOTE — PHYSICAL EXAM
[General Appearance - Alert] : alert [General Appearance - In No Acute Distress] : in no acute distress [Extraocular Movements] : extraocular movements were intact [Jugular Venous Distention Increased] : there was no jugular-venous distention [] : no respiratory distress [Respiration, Rhythm And Depth] : normal respiratory rhythm and effort [Exaggerated Use Of Accessory Muscles For Inspiration] : no accessory muscle use [Auscultation Breath Sounds / Voice Sounds] : lungs were clear to auscultation bilaterally [Heart Rate And Rhythm] : heart rate was normal and rhythm regular [Heart Sounds] : normal S1 and S2 [No CVA Tenderness] : no ~M costovertebral angle tenderness [Musculoskeletal - Swelling] : no joint swelling seen [No Focal Deficits] : no focal deficits [Oriented To Time, Place, And Person] : oriented to person, place, and time [FreeTextEntry1] : warm and dry

## 2022-11-30 NOTE — HISTORY OF PRESENT ILLNESS
[FreeTextEntry1] : 78 yo man w/ PMHx of Hypertension, Valvular heart disease, HLD, Diabetes, Bipolar depression is presenting today for optimization of hypertension management given labile blood pressure levels. \par Accompanied by stephanie Fitzgerald.\par \par long-standing hx of hypertension, >10 yrs \par patient admits to intermittent blood pressure with spikes in systolic pressure greater than 200 mmHg \par denies dizziness, lightheadedness, blurry vision, excessive sweating or tremors  \par no chest pain, shortness of breath, palpitations\par \par recently carvedilol up to 12.5 mg po bid, doxazosin 2 mg bid added, clonidine 0.1 mg tid and prn\par used to take HCTZ - dc 2/2 hyponatremia\par ?benazepril switched to coreg \par ?amlodipine \par \par patient is taking modafinil 50 mg bid for daytime somnolence \par \par

## 2022-11-30 NOTE — ASSESSMENT
[FreeTextEntry1] : 76 yo man w/ PMHx of Hypertension, Valvular heart disease, HLD, Diabetes, Bipolar depression is presenting today for optimization of hypertension management given labile blood pressure levels. \par \par \par #Hypertension - labile, uncontrolled \par - start furosemide 20 mg po qd \par - carvedilol 12.5 mg po bid\par - doxazosin 2 mg bid\par - clonidine 0.1 mg tid and prn\par - will discussed with Dr Corrales ACE/ARB and CCB \par - renal ultrasound with Doppler \par \par #Hyponatremia - in setting of HCTZ, resolved\par - continue to monitor \par \par #Hyperkalemia, mild - repeat level\par - continue to monitor \par \par #Diabetes - well controlled \par \par follow up in 2 weeks

## 2022-12-01 PROBLEM — Z87.898 HISTORY OF PALPITATIONS: Status: RESOLVED | Noted: 2020-06-22 | Resolved: 2022-12-01

## 2022-12-01 NOTE — HISTORY OF PRESENT ILLNESS
Received a call from Sandi (patient's significant other) regarding setting up an appointment. Fili did schedule an appointment on Saturday 12/18/21. He is also needing labs done (per the encounter), but the lab orders are not in the system yet (they are pending). Please enter the lab orders and call Sandi back at [phone] to they can set up the lab appointment.    [FreeTextEntry1] : 77-year-old man\par Routine followup\par Multiple rambling complaints including labile blood pressure levels gait disorder and balance insomnia fatigue tremor  dizziness palpitations and concerns for the presence of Parkinson disease\par No chest pain. No syncope.\suresh Carmona has been self adjusting multiple medications\suresh Carmona is accompanied today by his aide Amilcar.

## 2022-12-01 NOTE — REVIEW OF SYSTEMS
[Feeling Fatigued] : feeling fatigued [Joint Pain] : joint pain [Tremor] : a tremor was seen [Memory Lapses Or Loss] : memory lapses or loss [Negative] : Heme/Lymph [FreeTextEntry5] : see history of present illness [de-identified] : imbalnce

## 2022-12-01 NOTE — DISCUSSION/SUMMARY
[FreeTextEntry1] : Atherosclerotic heart disease\par Atherosclerotic heart disease is stable. An  11/19 Lexiscan sestamibi study was normal. The left ventricle ejection fraction was 63%. In 6/21 he was hospitalized following a mechanical fall and had to have elevated troponin level  30 - with questionable T-wave inversions in the lateral leads. He was diagnosed as having a non-STEMI. It is unclear what followup studies if any were done after that hospitalization.\par \par I have recommended the following\par a. Prior cardiac records not available at this time are requested for review\par b. Lexiscan sestamibi study 2023\par \par \par \par Valvular heart  disease\par The 10/22  echocardiogram revealed mild -moderate  aortic stenosis. The peak gradient was 18 mmHg mean gradient 10  mmHg. Aortic valve area 1.1 cm²  Aortic VTI ratio 0.27 . Moderate tricuspid regurgitation was seen Mild  mitral regurgitation was noted. Mild pulmonary hypertension was reflected by a pulmonary artery  systolic pressure of 48  mmHg. The left ventricular ejection fraction was 56%.\par . The present cardiac physical examination is not suggestive of severe aortic stenosis and  is consistent with the absence of heart failure..\par \par I have recommended the following:\par 1  No further cardiac testing for this problem at this time\par 2. Echocardiogram with next office evaluation in 6 months\par \par \par \par \par Hypertension\par Hypertension is  not  controlled on the present medical regimen.  The pressure levels have been severely labile   .Elevation of the blood pressure on initial examination today ( 162/74 mmHg ) may in part be related to a white coat effect.  In view of previous development of severe hyponatremia attributed to diuretic administration., diuretics should be avoided.   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  Previous treatment with  amlodipine was discontinued for unclear reasons\par \par I have recommended the following: \par 1. Low-salt low-fat low-cholesterol diabetic diet. Exercise to the extent possible\par 2  continue  the present medical regimen\par 3. Await Dr. Isaacs evaluation to rule out secondary causes of hypertension\par 4 . Further medication adjustment dictated by patient's clinical course\par \par \par \par \par Hyperlipidemia\par Hyperlipidemia represents a risk factor for  progressive atherosclerotic heart disease.   The patient carries a diagnoses of atherosclerotic heart disease as such the target LDL levels about 70.  HMG coA reductase inhibitor therapy has been effective  In 9/22 the serum cholesterol level was 188 triglycerides 63 HDL 97 and LDL 78 . Non-pharmacological therapy, specifically diet and exercise are emphasized as major aspects of treatment. \par \par I have recommended the following:\par 1. Low-salt low-fat low-cholesterol diabetic diet. Regular aerobic exercise to the extent possible\par 2. Target LDL level to about 70  as discussed above\par 3. Continue the present medical regimen\par \par \par \par \par Right bundle branch block\par The presence of a right bundle branch block and left axis deviation are consistent with significant conduction system disease.  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 The history is consistent with this diagnosis. The differential diagnoses would include a  nonsustained atrial arrhythmia. A.7/20 Zio patch 2 day mobile telemetry study reveals sinus rhythm /minute. No arrhythmia was seen.  In the setting of normal left ventricular systolic function (10/22  echocardiogram left ventricular ejection fraction  56%) the risk for development of malignant ventricular arrhythmia is low.  The degree of aortic stenosis does not appear to be severe enough to account for the patient's symptoms\par \par I have recommended the following\par a. No further cardiac testing for this problem at this time\par \par \par \par \par \par \par The diagnosis, prognosis, risks, options and alternatives were explained at length to the patient. All questions were answered. Issues discussed included hypertension hyperlipidemia dizziness rhythm disturbances atherosclerotic heart disease valvular heart disease and in particular aortic valve stenosis.\par \par Counseling and/or coordination of care\par Time was a significant factor for this patient encounter. Total time spent with the patient was 40 minutes. 50% of the time was devoted to counseling and/or coordination of care

## 2022-12-08 ENCOUNTER — RESULT REVIEW (OUTPATIENT)
Age: 77
End: 2022-12-08

## 2022-12-08 ENCOUNTER — APPOINTMENT (OUTPATIENT)
Dept: NEPHROLOGY | Facility: CLINIC | Age: 77
End: 2022-12-08

## 2022-12-08 VITALS
DIASTOLIC BLOOD PRESSURE: 76 MMHG | HEART RATE: 106 BPM | OXYGEN SATURATION: 97 % | SYSTOLIC BLOOD PRESSURE: 122 MMHG | BODY MASS INDEX: 24.68 KG/M2 | TEMPERATURE: 97.2 F | WEIGHT: 182 LBS

## 2022-12-08 PROCEDURE — 99214 OFFICE O/P EST MOD 30 MIN: CPT

## 2022-12-08 NOTE — ASSESSMENT
[FreeTextEntry1] : 78 yo man w/ PMHx of Hypertension, Valvular heart disease, HLD, Diabetes, Bipolar depression is following today for hypertension management given labile blood pressure levels and hyponatremia.\par \par \par #Hypertension - uncontrolled labile, adding diuretics is indicated \par - start furosemide 20 mg po qd \par - benazepril 10 mg qd \par - carvedilol 12.5 mg po bid\par - doxazosin 2 mg bid\par - clonidine 0.1 mg tid and prnr \par - unremarkable renal sono\par - pending renal ultrasound with Doppler\par \par #Hyponatremia - initially thought to be 2/2 HCTZ, persists despite drug dc'ed \par - appears to be hypervolemic \par - obtain sNa, sOsm, uric acid today\par - obtain Hernan, Uosm \par - check thyroid panel, am cortisol \par - furosemide 20 mg po qd \par - fluid restriction to 1.2-1.5 L a day \par \par #Hyperkalemia - likely 2/2 ACEi/ Benazepril \par - start furosemide 20 mg po qd\par \par follow up in 6 weeks

## 2022-12-08 NOTE — HISTORY OF PRESENT ILLNESS
[FreeTextEntry1] : 78 yo man w/ PMHx of Hypertension, Valvular heart disease, HLD, Diabetes, Bipolar depression is following today for hypertension management given labile blood pressure levels and hyponatremia.\par \par \par long-standing hx of hypertension, >10 yrs \par bp 122/76,  in the office today\par patient admits for better bp control, still elevated \par no dizziness, or lightheadedness\par no chest pain, shortness of breath, palpitations\par no n/v/d or dysuria \par lower extr edema and legs wobbling \par \par hyponatremia 2/2 HCTZ, discontinued \par \par patient is not compliant with furosemide\par \par \par

## 2022-12-08 NOTE — PHYSICAL EXAM
[General Appearance - Alert] : alert [General Appearance - In No Acute Distress] : in no acute distress [Extraocular Movements] : extraocular movements were intact [Jugular Venous Distention Increased] : there was no jugular-venous distention [] : no respiratory distress [Respiration, Rhythm And Depth] : normal respiratory rhythm and effort [Exaggerated Use Of Accessory Muscles For Inspiration] : no accessory muscle use [Auscultation Breath Sounds / Voice Sounds] : lungs were clear to auscultation bilaterally [Heart Rate And Rhythm] : heart rate was normal and rhythm regular [Heart Sounds] : normal S1 and S2 [No CVA Tenderness] : no ~M costovertebral angle tenderness [Musculoskeletal - Swelling] : no joint swelling seen [Oriented To Time, Place, And Person] : oriented to person, place, and time [FreeTextEntry1] : warm and dry

## 2023-01-12 ENCOUNTER — NON-APPOINTMENT (OUTPATIENT)
Age: 78
End: 2023-01-12

## 2023-01-16 ENCOUNTER — NON-APPOINTMENT (OUTPATIENT)
Age: 78
End: 2023-01-16

## 2023-01-19 ENCOUNTER — APPOINTMENT (OUTPATIENT)
Dept: NEPHROLOGY | Facility: CLINIC | Age: 78
End: 2023-01-19
Payer: SELF-PAY

## 2023-01-19 ENCOUNTER — RESULT REVIEW (OUTPATIENT)
Age: 78
End: 2023-01-19

## 2023-01-19 ENCOUNTER — LABORATORY RESULT (OUTPATIENT)
Age: 78
End: 2023-01-19

## 2023-01-19 VITALS
WEIGHT: 179 LBS | DIASTOLIC BLOOD PRESSURE: 60 MMHG | TEMPERATURE: 96.6 F | HEIGHT: 72 IN | HEART RATE: 57 BPM | BODY MASS INDEX: 24.24 KG/M2 | OXYGEN SATURATION: 97 % | SYSTOLIC BLOOD PRESSURE: 140 MMHG

## 2023-01-19 DIAGNOSIS — F31.9 BIPOLAR DISORDER, UNSPECIFIED: ICD-10-CM

## 2023-01-19 PROCEDURE — 99214 OFFICE O/P EST MOD 30 MIN: CPT

## 2023-01-20 PROBLEM — F31.9 BIPOLAR DISEASE, CHRONIC: Status: ACTIVE | Noted: 2023-01-20

## 2023-01-20 NOTE — ASSESSMENT
[FreeTextEntry1] : 78 yo man w/ PMHx of Hypertension, Valvular heart disease, HLD, Diabetes, Bipolar depression, Insomnia is following today for hypertension management given labile blood pressure levels.\par \par all available interim reports, images, labs, and medication list reviewed in details\par \par #Hypertension - uncontrolled labile, 140/60 in the office, up to 220/110 mmHg at home \par - advised to start diuretic furosemide 20 mg po q/ other day (not compliant, hx of hyponatremia w/ HCTZ )\par - benazepril increased up to 20 mg bid - follow up with bmet in 10-14 days after dose changes for renal fx and electrolytes monitoring \par - carvedilol increased up to 25 mg po bid\par - clonidine 0.1 mg tid and prn\par - doxazosin 2 mg bid\par - unremarkable renal sono\par - pending renal Doppler to r/o WILY\par - obtain serum renin, aldosterone, serum and 24hr urine metanephrines, cortisol today \par - will repeat thyroid panel today, PTH within normal limits\par - 24hr blood pressure monitoring recommended \par - sleep studies recommended to r/o NARCISA \par \par #Hyponatremia - initially likely due to HCTZ\par - currently intermittent could be due to hypervolemia\par - would benefit diuresis w/ furosemide 20 mg q/other day for now \par - keep fluid restriction up to 1.2-1.5 L a day \par \par #Hyperkalemia - likely 2/2 ACEi/ Benazepril \par - start furosemide 20 mg po q/ other day \par \par #Bipolar ds/ Insomnia - obtain specialist evaluation and recommendation for medical optimization \par \par follow up in 10-14 days with bw

## 2023-01-20 NOTE — HISTORY OF PRESENT ILLNESS
[FreeTextEntry1] : 76 yo man w/ PMHx of Hypertension, Valvular heart disease, HLD, Diabetes, Bipolar depression, Insomnia is following today for hypertension management given labile blood pressure levels.\par \par hx of long-standing hypertension, >10 yrs \par \par reports blood pressure levels up to 220/110 mmHg\par medications adjusted by Cardiology Dr Corrales:\par carvedilol up to 25 mg twice daily\par benazepril up to 20 mg twice daily\par sporadically takes furosemide 20 mg \par HCTZ was dc'ed 2/2 hyponatremia\par \par admits to poorly controlled insomnia \par no dizziness, or lightheadedness\par no chest pain, shortness of breath\par no n/v/d, abdominal or flank pain \par no changes in urination, dysuria or hematuria \par lower extr edema, legs wobbling, unsteady  \par \par \par \par \par

## 2023-01-23 ENCOUNTER — RX RENEWAL (OUTPATIENT)
Age: 78
End: 2023-01-23

## 2023-01-28 LAB
CORTIS 24H UR-MCNC: 24 H
CORTIS 24H UR-MRATE: 22 MCG/24 H
SPECIMEN VOL 24H UR: 2300 ML

## 2023-02-01 LAB
DOPAMINE UR-MCNC: 101 UG/L
DOPAMINE, UR, 24HR: 232 UG/24 HR
EPINEPH UR-MCNC: 3 UG/L
EPINEPHRINE, U, 24HR: 7 UG/24 HR
METANEPH 24H UR-MRATE: 198 UG/24 HR
METANEPHS 24H UR-MCNC: 86 UG/L
NOREPINEPH UR-MCNC: 12 UG/L
NOREPINEPHRINE,U,24H: 28 UG/24 HR
NORMETANEPHRINE 24 HR URINE: 566 UG/24 HR
NORMETANEPHRINE 24H UR-MCNC: 246 UG/L

## 2023-02-14 ENCOUNTER — RX RENEWAL (OUTPATIENT)
Age: 78
End: 2023-02-14

## 2023-02-24 ENCOUNTER — APPOINTMENT (OUTPATIENT)
Dept: CARDIOLOGY | Facility: CLINIC | Age: 78
End: 2023-02-24
Payer: SELF-PAY

## 2023-02-24 VITALS
OXYGEN SATURATION: 97 % | HEART RATE: 64 BPM | HEIGHT: 72 IN | SYSTOLIC BLOOD PRESSURE: 157 MMHG | BODY MASS INDEX: 23.57 KG/M2 | DIASTOLIC BLOOD PRESSURE: 56 MMHG | WEIGHT: 174 LBS

## 2023-02-24 PROCEDURE — 99215 OFFICE O/P EST HI 40 MIN: CPT

## 2023-02-25 NOTE — HISTORY OF PRESENT ILLNESS
[FreeTextEntry1] : 77-year-old man\par Routine follow-up\par \par Mathew complains bitterly of a nonproductive cough.  He continues to express great concern over elevated blood pressure levels despite the present medical regimen.  He denies exertional chest pain.

## 2023-02-25 NOTE — CC
Aster Valle was seen in the clinic today for a hearing evaluation. Aster's mother reported Aster has had recurrent ear infections.    Soundfield Visual Reinforcement Audiometry (VRA) revealed responses to narrowband noise stimuli from 25-45 dBHL in the 500-4000 Hz frequency range. A speech awareness threshold was obtained in soundfield at 20 dBHL.    Recommendations:  1. Otologic evaluation  2. Follow-up hearing evaluation         [DrLoretta  ___] : Dr. BANDA

## 2023-02-25 NOTE — PHYSICAL EXAM
[Normal Conjunctiva] : the conjunctiva exhibited no abnormalities [Heart Sounds] : normal S1 and S2 [Auscultation Breath Sounds / Voice Sounds] : lungs were clear to auscultation bilaterally [Edema] : no peripheral edema present [Bowel Sounds] : normal bowel sounds [Abdomen Soft] : soft [Abdomen Tenderness] : non-tender [Abnormal Walk] : normal gait [] : no rash [Affect] : the affect was normal [FreeTextEntry1] : pleasant

## 2023-02-25 NOTE — REVIEW OF SYSTEMS
[Feeling Fatigued] : feeling fatigued [Cough] : cough [Joint Pain] : joint pain [Anxiety] : anxiety [Negative] : Heme/Lymph [FreeTextEntry5] : See history of present illness

## 2023-02-25 NOTE — DISCUSSION/SUMMARY
[FreeTextEntry1] : Cough\par The working diagnosis is a ACE–I  (benazepril) induced cough.  The history is consistent with this diagnosis.  The differential diagnosis would include bronchitis.\par \par I have recommended the following\par a discontinue benazepril \par b.  Chest x-ray PA and lateral\par \par \par \par \par Atherosclerotic heart disease\par Atherosclerotic heart disease is stable. An  11/19 Lexiscan sestamibi study was normal. The left ventricle ejection fraction was 63%. In 6/21 he was hospitalized following a mechanical fall and had to have elevated troponin level  30 - with questionable T-wave inversions in the lateral leads. He was diagnosed as having a non-STEMI. It is unclear what followup studies if any were done after that hospitalization.\par \par I have recommended the following\par a.  Lexiscan sestamibi study \par \par \par \par Valvular heart  disease\par The 10/22  echocardiogram revealed mild -moderate  aortic stenosis. The peak gradient was 18 mmHg mean gradient 10  mmHg. Aortic valve area 1.1 cm²  Aortic VTI ratio 0.27 . Moderate tricuspid regurgitation was seen Mild  mitral regurgitation was noted. Mild pulmonary hypertension was reflected by a pulmonary artery  systolic pressure of 48  mmHg. The left ventricular ejection fraction was 56%.\par . The present cardiac physical examination is not suggestive of severe aortic stenosis and  is consistent with the absence of heart failure..\par \par I have recommended the following:\par 1  No further cardiac testing for this problem at this time\par 2. Echocardiogram with next office evaluation \par \par \par \par Hypertension\par Hypertension  may   not   be optimally controlled on the present medical regimen.  The pressure levels have been severely labile   .Elevation of the blood pressure on initial examination today ( 157/56  mmHg ) may in part be related to a white coat effect.  In view of previous development of severe hyponatremia attributed to diuretic administration., diuretics should be avoided.     Previous treatment with  amlodipine was discontinued for unclear reasons.  An evaluation by Dr. Isaacs for secondary causes of hypertension revealed elevated epinephrine levels.\par \par I have recommended the following: \par 1. Low-salt low-fat low-cholesterol diabetic diet. Exercise to the extent possible\par 2 discontinue benazepril  (see cough entry)\par 3.  Losartan 50 mg/day with further dose adjustment dictated by tolerance and response\par 4.  Dr. Soliman nephrology consultation\par .5  Further medication adjustment dictated by patient's clinical course\par \par \par \par \par Hyperlipidemia\par Hyperlipidemia represents a risk factor for  progressive atherosclerotic heart disease.  The target LDL level in a patient carrying a diagnosis of atherosclerotic heart disease is about 70.    In 11/22  the serum cholesterol level was 232  triglycerides 61  HDL 88  and  The ability of high HDL levels to provide protection against cardiovascular events is controversial.  The dose of atorvastatin may be increased if required to obtain optimal levels.. Non-pharmacological therapy, specifically diet and exercise are emphasized as major aspects of treatment. \par \par I have recommended the following:\par 1. Low-salt low-fat low-cholesterol diabetic diet. Regular aerobic exercise to the extent possible\par 2.  Target LDL level to about 70 as discussed above\par 3.  Laboratory studies including lipid profile through primary care\par 4.  Increase atorvastatin dose if required to obtain optimal levels as discussed above\par \par \par \par \par \par Right bundle branch block\par The presence of a right bundle branch block and left axis deviation are consistent with significant conduction system disease.  Permanent pacemaker implantation is not indicated in the absence of symptomatic bradycardia or high degree AV block\par \par I have  recommended the following:\par 1 no further cardiac testing for this problem at this time.\par \par \par \par \par \par \par The diagnosis, prognosis, risks, options and alternatives were explained at length to the patient. All questions were answered. Issues discussed included  cough  hypertension hyperlipidemia  atherosclerotic heart disease valvular heart disease and in particular aortic valve stenosis.\par \par Counseling and/or coordination of care\par Time was a significant factor for this patient encounter. Total time spent with the patient was 40 minutes. 50% of the time was devoted to counseling and/or coordination of care

## 2023-02-27 ENCOUNTER — RESULT REVIEW (OUTPATIENT)
Age: 78
End: 2023-02-27

## 2023-03-01 ENCOUNTER — NON-APPOINTMENT (OUTPATIENT)
Age: 78
End: 2023-03-01

## 2023-03-08 ENCOUNTER — APPOINTMENT (OUTPATIENT)
Dept: NEPHROLOGY | Facility: CLINIC | Age: 78
End: 2023-03-08
Payer: SELF-PAY

## 2023-03-08 VITALS
BODY MASS INDEX: 23.84 KG/M2 | HEART RATE: 74 BPM | TEMPERATURE: 95.8 F | SYSTOLIC BLOOD PRESSURE: 120 MMHG | HEIGHT: 72 IN | DIASTOLIC BLOOD PRESSURE: 60 MMHG | OXYGEN SATURATION: 97 % | WEIGHT: 176 LBS

## 2023-03-08 DIAGNOSIS — F41.9 ANXIETY DISORDER, UNSPECIFIED: ICD-10-CM

## 2023-03-08 DIAGNOSIS — F32.A ANXIETY DISORDER, UNSPECIFIED: ICD-10-CM

## 2023-03-08 PROCEDURE — 99214 OFFICE O/P EST MOD 30 MIN: CPT

## 2023-03-08 NOTE — HISTORY OF PRESENT ILLNESS
[FreeTextEntry1] : 76 yo man w/ PMHx of Hypertension, Valvular heart disease, HLD, Diabetes, Bipolar depression, Insomnia is following up today for eval of labile HTN\par - states he often gets readings > 200\par \par States that any stimulus affects it - "whiff of coffee, telephone ringing, issue with medicare, conversation with son, issues with work being done in the [place] community"\par  "\par States he does not take medication regularly - "normally takes 0.1 clonidine in the morning", but then stated he takes his BP meds regularly"\par \par hx of long-standing hypertension, >10 yrs \par \par reports blood pressure levels up to 220/110 mmHg\par medications adjusted by Cardiology Dr Corrales:\par carvedilol up to 25 mg twice daily\par losartan\par sporadically takes furosemide 20 mg \par HCTZ was dc'ed 2/2 hyponatremia\par \par admits to poorly controlled insomnia \par no dizziness, or lightheadedness\par no chest pain, shortness of breath\par no n/v/d, abdominal or flank pain \par no changes in urination, dysuria or hematuria \par lower extr edema, legs wobbling, unsteady  \par \par \par \par \par

## 2023-03-08 NOTE — ASSESSMENT
[FreeTextEntry1] : 76 yo man w/ PMHx of Hypertension, Valvular heart disease, HLD, Diabetes, Bipolar depression, Insomnia is following today for hypertension management given labile blood pressure levels.\par \par Norepinephrine level 594 ( N 0-874)\par Epinephrine 111 (N 0-62)\par Dopamine 71 ( N 0-48)\par \par Metanephrine 38.9 (0-88)\par \par 24 hour urine studies ( catecholamines, metanephrines, vanillylmandelic acid all wnl)\par Serum catecholamines slightly elevated c/w stress and anxiety - pt volunteers he is extremely fearful , under great stress and anxious  about many facets of his life - has had anxiety since childhood, has seen psychotherapist\par \par # anxiety most likely source of labile HTN - advised he seek professional therapy/treatment for his psychiatric issues \par \par #Hypertension\par  - reportedly labile, was 120/60 in the office today, pt reports up to 220/110 mmHg at home \par - though serum catecholamines slightly elevated, not 3-4x above uln, most c/w underlying stress and anxiety\par \par - carvedilol increased up to 25 mg po bid\par - clonidine 0.1 mg tid and prn\par - doxazosin 2 mg bid\par - unremarkable renal sono\par - pending renal Doppler to r/o WILY\par - obtain serum renin, aldosterone, serum and 24hr urine metanephrines, cortisol today \par - will repeat thyroid panel today, PTH within normal limits\par - 24hr blood pressure monitoring recommended \par - pt states he had sleep study in the past\par \par \par #Bipolar ds/ Insomnia - obtain specialist evaluation and recommendation for medical optimization \par \par f/u after seeking psychiatric attention\par

## 2023-03-31 ENCOUNTER — APPOINTMENT (OUTPATIENT)
Dept: CARDIOLOGY | Facility: CLINIC | Age: 78
End: 2023-03-31
Payer: SELF-PAY

## 2023-03-31 VITALS
WEIGHT: 176 LBS | DIASTOLIC BLOOD PRESSURE: 60 MMHG | HEART RATE: 57 BPM | SYSTOLIC BLOOD PRESSURE: 164 MMHG | OXYGEN SATURATION: 98 % | BODY MASS INDEX: 23.84 KG/M2 | HEIGHT: 72 IN

## 2023-03-31 PROCEDURE — 99215 OFFICE O/P EST HI 40 MIN: CPT

## 2023-04-01 RX ORDER — FUROSEMIDE 80 MG/1
TABLET ORAL
Refills: 0 | Status: ACTIVE | COMMUNITY

## 2023-04-01 NOTE — DISCUSSION/SUMMARY
[FreeTextEntry1] : Atherosclerotic heart disease\par Atherosclerotic heart disease is stable. An  11/19 Lexiscan sestamibi study was normal. The left ventricle ejection fraction was 63%. In 6/21 he was hospitalized following a mechanical fall and had to have elevated troponin level  30 - with questionable T-wave inversions in the lateral leads. He was diagnosed as having a non-STEMI.  A 2/23 Lexiscan sestamibi study was normal.  The left ventricular ejection fraction was 65%.  In view of the lack of angina, above noninvasive study and clinical course continued medical management is indicated at this time.  \par \par I have recommended the following\par a.  Risk factor modification\par b   No further cardiac testing for this problem at this time.  \par \par \par \par Valvular heart  disease\par The 10/22  echocardiogram revealed mild -moderate  aortic stenosis. The peak gradient was 18 mmHg mean gradient 10  mmHg. Aortic valve area 1.1 cm²  Aortic VTI ratio 0.27 . Moderate tricuspid regurgitation was seen Mild  mitral regurgitation was noted. Mild pulmonary hypertension was reflected by a pulmonary artery  systolic pressure of 48  mmHg. The left ventricular ejection fraction was 56%.\par . The present cardiac physical examination is not suggestive of severe aortic stenosis and  is consistent with the absence of heart failure..\par \par I have recommended the following:\par 1  No further cardiac testing for this problem at this time\par 2. Echocardiogram with next office evaluation \par \par \par \par Hypertension\par The working diagnosis is severe labile essential hypertension.  .  The pressure levels have been severely labile   .Elevation of the blood pressure on initial examination today ( 164/60  mmHg ) may in part be related to a white coat effect.  In view of previous development of severe hyponatremia attributed to diuretic administration., diuretics should be avoided.     Previous treatment with  amlodipine was discontinued for unclear reasons.  Benazepril was discontinued due to a cough.  An evaluation by Dr. Isaacs for secondary causes of hypertension revealed elevated epinephrine levels but no other abnormalities..  In my judgment in view concerns for systemic hypotension aggressive administration of antihypertensive agents should should be avoided with permissive treatment for hypertension.  The patient self adjustment of medications is a major influence on management.\par \par I have recommended the following: \par 1. Low-salt low-fat low-cholesterol diabetic diet. Exercise to the extent possible\par 2   continue the present medical regimen\par 3.  Follow-up with nephrology/Dr. Soliman\par \par \par \par \par Hyperlipidemia\par Hyperlipidemia represents a risk factor for  progressive atherosclerotic heart disease.  The target LDL level in a patient carrying a diagnosis of atherosclerotic heart disease is about 70.    In 11/22  the serum cholesterol level was 232  triglycerides 61  HDL 88  and  The ability of high HDL levels to provide protection against cardiovascular events is controversial.  The dose of atorvastatin may be increased if required to obtain optimal levels.. Non-pharmacological therapy, specifically diet and exercise are emphasized as major aspects of treatment. \par \par I have recommended the following:\par 1. Low-salt low-fat low-cholesterol diabetic diet. Regular aerobic exercise to the extent possible\par 2.  Target LDL level to about 70 as discussed above\par 3.  Laboratory studies including lipid profile through primary care\par 4.  Increase atorvastatin dose if required to obtain optimal levels as discussed above\par \par \par \par \par \par Right bundle branch block\par The presence of a right bundle branch block and left axis deviation are consistent with significant conduction system disease.  Permanent pacemaker implantation is not indicated in the absence of symptomatic bradycardia or high degree AV block\par \par I have  recommended the following:\par 1 no further cardiac testing for this problem at this time.\par \par \par \par \par \par \par The diagnosis, prognosis, risks, options and alternatives were explained at length to the patient. All questions were answered. Issues discussed included  cough  hypertension hyperlipidemia  atherosclerotic heart disease valvular heart disease and in particular aortic valve stenosis.\par \par Counseling and/or coordination of care\par Time was a significant factor for this patient encounter. Total time spent with the patient was 40 minutes. 50% of the time was devoted to counseling and/or coordination of care

## 2023-04-01 NOTE — HISTORY OF PRESENT ILLNESS
[FreeTextEntry1] : 77-year-old man\par Routine follow-up\par Multiple complaints including labile blood pressure levels and imbalance falling with head trauma and fatigue.  No angina.  No shortness of breath at rest.  No syncope.  He was seen by Dr. Soliman/nephrology.  No change in medical treatment advised.  Psychiatric reevaluation was recommended (see medical records) \par Mathew self adjusts his medications.\par He is undergoing a neurological evaluation at [facility] neurological [place] through Dr. Esposito.

## 2023-04-01 NOTE — REVIEW OF SYSTEMS
[Feeling Fatigued] : feeling fatigued [Urinary Frequency] : urinary frequency [Joint Pain] : joint pain [Depression] : depression [Anxiety] : anxiety [Negative] : Heme/Lymph [FreeTextEntry5] : see history of present illness [de-identified] : Imbalance/uses a cane

## 2023-04-01 NOTE — PHYSICAL EXAM
[Normal Conjunctiva] : the conjunctiva exhibited no abnormalities [Auscultation Breath Sounds / Voice Sounds] : lungs were clear to auscultation bilaterally [Heart Sounds] : normal S1 and S2 [Edema] : no peripheral edema present [Bowel Sounds] : normal bowel sounds [Abdomen Soft] : soft [Abnormal Walk] : normal gait [Abdomen Tenderness] : non-tender [] : no rash [Affect] : the affect was normal [FreeTextEntry1] : pleasant

## 2023-06-05 ENCOUNTER — APPOINTMENT (OUTPATIENT)
Dept: CARDIOLOGY | Facility: CLINIC | Age: 78
End: 2023-06-05

## 2023-06-12 ENCOUNTER — NON-APPOINTMENT (OUTPATIENT)
Age: 78
End: 2023-06-12

## 2023-06-12 ENCOUNTER — RESULT REVIEW (OUTPATIENT)
Age: 78
End: 2023-06-12

## 2023-06-12 ENCOUNTER — APPOINTMENT (OUTPATIENT)
Dept: CARDIOLOGY | Facility: CLINIC | Age: 78
End: 2023-06-12
Payer: MEDICARE

## 2023-06-12 VITALS
WEIGHT: 177 LBS | SYSTOLIC BLOOD PRESSURE: 111 MMHG | HEIGHT: 75 IN | BODY MASS INDEX: 22.01 KG/M2 | HEART RATE: 66 BPM | OXYGEN SATURATION: 96 % | DIASTOLIC BLOOD PRESSURE: 53 MMHG

## 2023-06-12 PROCEDURE — 93000 ELECTROCARDIOGRAM COMPLETE: CPT

## 2023-06-12 PROCEDURE — 99215 OFFICE O/P EST HI 40 MIN: CPT

## 2023-06-15 ENCOUNTER — NON-APPOINTMENT (OUTPATIENT)
Age: 78
End: 2023-06-15

## 2023-06-19 NOTE — HISTORY OF PRESENT ILLNESS
[FreeTextEntry1] : 77 year old man\par routine follow up\par continues to complain bitterly  of "spiking "  blood pressures to as high as 200 mmHg .\par he  has not been taking cardura as prescribed.

## 2023-06-19 NOTE — DISCUSSION/SUMMARY
[FreeTextEntry1] : Atherosclerotic heart disease\par Atherosclerotic heart disease is stable. An  11/19 Lexiscan sestamibi study was normal. The left ventricle ejection fraction was 63%. In 6/21 he was hospitalized following a mechanical fall and had to have elevated troponin level  30 - with questionable T-wave inversions in the lateral leads. He was diagnosed as having a non-STEMI.  A 2/23 Lexiscan sestamibi study was normal.  The left ventricular ejection fraction was 65%.  The 6/23 echocardiogram showed a left ventricular ejection fraction of 53%.  In view of the lack of angina, above noninvasive study and clinical course continued medical management is indicated at this time.  \par \par I have recommended the following\par a.  Risk factor modification\par b   No further cardiac testing for this problem at this time.  \par \par \par \par Valvular heart  disease\par The 10/22  echocardiogram revealed mild -moderate  aortic stenosis. The peak gradient was 18 mmHg mean gradient 10  mmHg. Aortic valve area 1.1 cm²  Aortic VTI ratio 0.27 . Moderate tricuspid regurgitation was seen Mild  mitral regurgitation was noted. Mild pulmonary hypertension was reflected by a pulmonary artery  systolic pressure of 48  mmHg. The left ventricular ejection fraction was 56%. \par \par \par  The 6/23 echocardiogram again showed mild–moderate aortic stenosis.  The peak gradient was 26 mmHg mean gradient 13 mmHg aortic valve area 0.9 cm².  Trace aortic/mitral and moderate tricuspid regurgitation were reported.  Severe pulmonary hypertension was reflected by pulmonary artery systolic pressure of 98 mmHg.  .\par . The present cardiac physical examination is not suggestive of severe aortic stenosis  and  the absence of heart failure..  The degree of pulmonary hypertension appears to be out of proportion to the severity of valvular pathology.\par . \par I have recommended the following:\par 1 pulmonary hypertension consultation Dr. Gutierres \par \par \par \par Hypertension\par The working diagnosis is severe labile essential hypertension.  .  Blood  pressure levels have been severely labile   .  In view of previous development of severe hyponatremia attributed to diuretic administration., diuretics should be avoided.     Previous treatment with  amlodipine was discontinued for unclear reasons.  Benazepril was discontinued due to a cough.  An evaluation by Dr. Isaacs for secondary causes of hypertension revealed elevated epinephrine levels but no other abnormalities\par Doxazosin may be beneficial to avoid  blood pressure "spikes."..  In my judgment in view concerns for systemic hypotension aggressive administration of antihypertensive agents should should be avoided with permissive treatment for hypertension.  The patient self adjustment of medications is a major influence on management.\par \par I have recommended the following: \par 1. Low-salt low-fat low-cholesterol diabetic diet. Exercise to the extent possible\par 2   doxazosin 2 mg bid   ( prescribed but not  taken )\par \par \par \par \par \par Hyperlipidemia\par Hyperlipidemia represents a risk factor for  progressive atherosclerotic heart disease.  The target LDL level in a patient carrying a diagnosis of atherosclerotic heart disease is about 70.    In 11/22  the serum cholesterol level was 232  triglycerides 61  HDL 88  and  The ability of high HDL levels to provide protection against cardiovascular events is controversial.  The dose of atorvastatin may be increased if required to obtain optimal levels.. Non-pharmacological therapy, specifically diet and exercise are emphasized as major aspects of treatment. \par \par I have recommended the following:\par 1. Low-salt low-fat low-cholesterol diabetic diet. Regular aerobic exercise to the extent possible\par 2.  Target LDL level to about 70 as discussed above\par 3.  Laboratory studies including lipid profile through primary care\par 4.  Increase atorvastatin dose if required to obtain optimal levels as discussed above\par \par \par \par \par \par Right bundle branch block\par The presence of a right bundle branch block and left axis deviation are consistent with significant conduction system disease.  Permanent pacemaker implantation is not indicated in the absence of symptomatic bradycardia or high degree AV block\par \par I have  recommended the following:\par 1 no further cardiac testing for this problem at this time.\par \par \par \par \par \par \par The diagnosis, prognosis, risks, options and alternatives were explained at length to the patient. All questions were answered. Issues discussed included   hypertension antihypertensive agents pulmonary hypertension hyperlipidemia  atherosclerotic heart disease valvular heart disease and in particular aortic valve stenosis.\par \par Counseling and/or coordination of care\par Time was a significant factor for this patient encounter. Total time spent with the patient was 40 minutes. 50% of the time was devoted to counseling and/or coordination of care

## 2023-07-03 ENCOUNTER — APPOINTMENT (OUTPATIENT)
Dept: CARDIOLOGY | Facility: CLINIC | Age: 78
End: 2023-07-03

## 2023-07-05 ENCOUNTER — APPOINTMENT (OUTPATIENT)
Dept: PULMONOLOGY | Facility: CLINIC | Age: 78
End: 2023-07-05
Payer: MEDICARE

## 2023-07-05 VITALS
BODY MASS INDEX: 22.75 KG/M2 | TEMPERATURE: 97.8 F | OXYGEN SATURATION: 99 % | HEART RATE: 60 BPM | HEIGHT: 75 IN | DIASTOLIC BLOOD PRESSURE: 65 MMHG | SYSTOLIC BLOOD PRESSURE: 121 MMHG | WEIGHT: 183 LBS

## 2023-07-05 PROCEDURE — 99205 OFFICE O/P NEW HI 60 MIN: CPT

## 2023-07-05 RX ORDER — METFORMIN HYDROCHLORIDE 625 MG/1
TABLET ORAL
Refills: 0 | Status: DISCONTINUED | COMMUNITY
End: 2023-07-05

## 2023-07-05 RX ORDER — FUROSEMIDE 20 MG/1
20 TABLET ORAL
Qty: 90 | Refills: 3 | Status: DISCONTINUED | COMMUNITY
Start: 2023-06-19 | End: 2023-07-05

## 2023-07-05 RX ORDER — DOXEPIN 6 MG/1
TABLET, FILM COATED ORAL
Refills: 0 | Status: DISCONTINUED | COMMUNITY
End: 2023-07-05

## 2023-07-05 RX ORDER — FUROSEMIDE 20 MG/1
20 TABLET ORAL
Qty: 30 | Refills: 1 | Status: DISCONTINUED | COMMUNITY
Start: 2022-11-10 | End: 2023-07-05

## 2023-07-05 RX ORDER — CARVEDILOL 3.12 MG/1
TABLET, FILM COATED ORAL
Refills: 0 | Status: DISCONTINUED | COMMUNITY
End: 2023-07-05

## 2023-07-05 RX ORDER — LOSARTAN POTASSIUM 100 MG/1
TABLET, FILM COATED ORAL
Refills: 0 | Status: DISCONTINUED | COMMUNITY
End: 2023-07-05

## 2023-07-05 RX ORDER — DOXAZOSIN 2 MG/1
2 TABLET ORAL
Qty: 180 | Refills: 3 | Status: DISCONTINUED | COMMUNITY
Start: 2023-06-12 | End: 2023-07-05

## 2023-07-05 RX ORDER — MODAFINIL 200 MG/1
TABLET ORAL
Refills: 0 | Status: COMPLETED | COMMUNITY
End: 2023-07-05

## 2023-07-05 RX ORDER — LOSARTAN POTASSIUM 50 MG/1
50 TABLET, FILM COATED ORAL TWICE DAILY
Qty: 180 | Refills: 3 | Status: DISCONTINUED | COMMUNITY
Start: 2023-03-31 | End: 2023-07-05

## 2023-07-05 RX ORDER — CLONIDINE HYDROCHLORIDE 0.1 MG/1
0.1 TABLET ORAL TWICE DAILY
Qty: 180 | Refills: 1 | Status: DISCONTINUED | COMMUNITY
Start: 2022-09-07 | End: 2023-07-05

## 2023-07-05 RX ORDER — PRIMIDONE 50 MG/1
TABLET ORAL
Refills: 0 | Status: ACTIVE | COMMUNITY

## 2023-07-06 ENCOUNTER — NON-APPOINTMENT (OUTPATIENT)
Age: 78
End: 2023-07-06

## 2023-07-06 LAB
ALBUMIN SERPL ELPH-MCNC: 4.6 G/DL
ALP BLD-CCNC: 134 U/L
ALT SERPL-CCNC: 20 U/L
ANION GAP SERPL CALC-SCNC: 14 MMOL/L
AST SERPL-CCNC: 22 U/L
BASOPHILS # BLD AUTO: 0.07 K/UL
BASOPHILS NFR BLD AUTO: 1.3 %
BILIRUB SERPL-MCNC: <0.2 MG/DL
BUN SERPL-MCNC: 25 MG/DL
C3 SERPL-MCNC: 106 MG/DL
C4 SERPL-MCNC: 28 MG/DL
CALCIUM SERPL-MCNC: 9.7 MG/DL
CHLORIDE SERPL-SCNC: 100 MMOL/L
CO2 SERPL-SCNC: 26 MMOL/L
CREAT SERPL-MCNC: 1.25 MG/DL
EGFR: 59 ML/MIN/1.73M2
EOSINOPHIL # BLD AUTO: 0.16 K/UL
EOSINOPHIL NFR BLD AUTO: 2.9 %
ESTIMATED AVERAGE GLUCOSE: 148 MG/DL
HBA1C MFR BLD HPLC: 6.8 %
HCT VFR BLD CALC: 35.5 %
HGB BLD-MCNC: 10.8 G/DL
HIV1+2 AB SPEC QL IA.RAPID: NONREACTIVE
IMM GRANULOCYTES NFR BLD AUTO: 0.4 %
LYMPHOCYTES # BLD AUTO: 0.88 K/UL
LYMPHOCYTES NFR BLD AUTO: 16.1 %
MAN DIFF?: NORMAL
MCHC RBC-ENTMCNC: 30 PG
MCHC RBC-ENTMCNC: 30.4 GM/DL
MCV RBC AUTO: 98.6 FL
MONOCYTES # BLD AUTO: 0.5 K/UL
MONOCYTES NFR BLD AUTO: 9.1 %
NEUTROPHILS # BLD AUTO: 3.84 K/UL
NEUTROPHILS NFR BLD AUTO: 70.2 %
NT-PROBNP SERPL-MCNC: 743 PG/ML
PLATELET # BLD AUTO: 210 K/UL
POTASSIUM SERPL-SCNC: 4.3 MMOL/L
PROT SERPL-MCNC: 6.9 G/DL
RBC # BLD: 3.6 M/UL
RBC # FLD: 15.4 %
RHEUMATOID FACT SER QL: 24 IU/ML
SODIUM SERPL-SCNC: 140 MMOL/L
URATE SERPL-MCNC: 5 MG/DL
WBC # FLD AUTO: 5.47 K/UL

## 2023-07-06 NOTE — CONSULT LETTER
[Dear  ___] : Dear  [unfilled], [Consult Letter:] : I had the pleasure of evaluating your patient, [unfilled]. [Please see my note below.] : Please see my note below. [Consult Closing:] : Thank you very much for allowing me to participate in the care of this patient.  If you have any questions, please do not hesitate to contact me. [Sincerely,] : Sincerely, [FreeTextEntry2] : Dr. Fredy Corrales [FreeTextEntry3] : Rita Gutierres DO \par Director of Pulmonary Hypertension\par Department of Pulmonary and Critical Care\par [facility]

## 2023-07-06 NOTE — END OF VISIT
[] : Fellow [FreeTextEntry3] : 78 yo M w/ history as above who was found more incidentally to have severe PH and abnormal RV, significantly altered from prior TTE in October 2022. No significant progression of symptoms, but may be limited by neurologic disorder. 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 Does have grade II DD. Will assess for CTD given autoimmune history, will also r/o underlying CTEPH w/ V/Q scan. Plan to obtain RHC and pending results of hemodynamics consider further work up. Risks and benefits explained, patient is agreeable.  [Time Spent: ___ minutes] : I have spent [unfilled] minutes of time on the encounter.

## 2023-07-06 NOTE — HISTORY OF PRESENT ILLNESS
[Never] : never [TextBox_4] : 77 year old man with severe labile essential hypertension, CIDP, iron deficiency anemia requiring infusions, asthma, CAD, bipolar disorder, diabetes mellitus, Guillan-Cushing syndrome in 2021, aortic stenosis, hiatal hernia, neurogenic bladder, seizures.  Echo 6/2023 Severe pulmonary hypertension was reflected by pulmonary artery systolic pressure of 98 mmHg, for which the patient was referred by cardiology Dr. Corrales. October 2022 PASP 48. and June 2023 PASP 98mmHg.\par \par Mentions shortness of breath after 0.7 miles. Sleeps on 1 pillow at night. Denies orthopnea. Mentions that he had a sleep study and was ruled out for apnea. However he is concerned that he may have developed sleep apnea. Was on modafinil in the remote past but is currently not on it.Takes horny goat weed. Feels fatigued too. Denies syncope, chest pain, palpitations. Does have "dizziness" although describes more balance issues and requires ambulating with a cane. Has not noticed decreased exercise tolerance due to SOB, more due to leg weakness. Is afraid that if he did try to exert himself more he would be limited 2/2 breathing. He has severely labile blood pressure - in the evenings tends to be hypertensive to SBP >200, but can drop low during the day.\par \par PH Risk factors:\par FH sudden cardiac death: (-)\par IVDU or substance use including alcohol :(-)\par Anorexigens :(-)\par Amphetamines :(-)\par Rheumatological dx :(-)\par Congenital heart dx :(-)\par HTN,Afib, diastolic dysfunction, large LA, valvular dx :(+ valvular disease, +severe HTN, +CAD)\par Metabolic syndrome including obesity :(+ diabetes)\par Parenchymal lung dx :(-)\par NARCISA :(-)\par Hx of DVT or PE: (-)\par Hx of splenectomy :(-)\par Liver dx :(-)\par Kidney dx :(-)\par Hematological/Malignancy/Anemia :(-+ iron deficiency anemia)\par HIV:(-)\par

## 2023-07-06 NOTE — REASON FOR VISIT
[Initial] : an initial visit [Pulmonary Hypertension] : pulmonary hypertension [TextBox_13] : Dr. Fredy Corrales

## 2023-07-06 NOTE — PROCEDURE
[FreeTextEntry1] : 6/13/23 ECHO\par   1. Heart is tilted downward, making imaging a bit problematic.  \par    2. Left ventricular chamber dimension is normal.  \par    3. There is mild left ventricular hypertrophy.  \par    4. Left ventricular systolic function is normal  with an estimated ejection  fraction of 53 % by 2D.  \par    5. The left ventricle demonstrates grade II diastolic dysfunction..  \par    6. Right ventricular seems foreshortened but dimension is grossly normal.  \par    7. Right ventricular systolic function is borderline reduced.  \par    8. Left atrial chamber is severely increased with a left atrial volume index of 49 ml/m2..  \par    9. Right atrial chamber dimension is enlarged.  \par    10. There is moderate aortic valve sclerosis.  \par    11. There is mild to moderate aortic valve stenosis with a peak gradient of 26 mmHg, mean gradient of 13 mmHg, and aortic valve area of 0.90 cm2.  \par    12. There is trace aortic valve regurgitation.  \par    13. The mitral valve has thickened leaflets.  \par    14. There is trace mitral valve regurgitation.  \par    15. There is moderate tricuspid valve regurgitation.  \par    16. Severe pulmonary hypertension, estimated pulmonary arterial systolic pressure is 98 mmHg.  \par    17. There is trace pulmonic regurgitation.  \par *****\par 2/27/23 Myocardial perfusion Scan\par No chest pain or ECG changes with regadenoson stress.  \par  No evidence of ischemia or infarct per myocardial perfusion imaging with likely diaphragmatic \par attenuation artifact.  \par  Normal left ventricular wall motion.  \par  Calculated left ventricular ejection fraction was 65%.\par *****\par 10/11/22 ECHO\par 1. Normal left ventricular size and systolic function, LVEF 56%. \par 2. Moderate (Grade 2) left ventricular diastolic dysfunction. \par 3. Mild concentric left ventricular hypertrophy. \par 4. Severely dilated left atrium. \par 5. Mild - to - moderate aortic stenosis (ARNOLD 1.11 cm2, mean gradient 10.9 mmHg, DVI 0.27). \par 6. Mild mitral regurgitation. \par 7. Moderate tricuspid regurgitation. \par 8. Mild pulmonary hypertension with PASP 48 mmHg\par *****\par 7/26/21 CTA CHEST\par Large hiatal hernia deviates the heart which appears upper limits of normal in size.  \par Areas of left lung infiltrate/atelectasis. Pneumonia cannot be excluded.  \par Small right layering pleural effusion.\par *****\par

## 2023-07-06 NOTE — ASSESSMENT
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, severe labile HTN, chronic JUDY on IV iron infusions, asthma, CAD, DM, bipolar disorder who c/o exercise mostly limited by LE strength, but occasionally notices FRANCOIS and found on F/U TTE to have severe PH and abnormal RV size (previously w/ mild PH and normal RV size/function) \par \par Data reviewed: TTE (10/14/22), TTE (6/13/23), CTA PE 6/2021\par \par Assessment:\par #Elevated PASP 98\par #Moderate AV stenosis\par #Grade II diastolic dysfunction\par #RV dilation\par \par Suspect a majority of PH is due to Group II PH (+CAD, valvular heart disease, systemic arterial HTN, diastolic dysfunction).Group V may play a component due to history of anemia. Will screen for Group I PH and Group IV occult pulmonary thrombotic/occlusive disease. Because of his potential history of autoimmune disease (CIDP) we will also screen for connective tissue diseases. No significant RF for WHO Group II disease (well controlled mild asthma, nonsmoker, no emphysema or ILD on CTA from 2021). Overall, unusual to develop near systemic PA pressures over 6 months without significant symptoms; but may be limited in assessing FRANCOIS due to underlying neurologic disorder. Given the rapid rise in PASP and the new abnormal findings of RV, will proceed w/ RHC. \par \par Plan:\par -BNP, CMP, CBC, HgA1c, HIV testing\par -CTD testing\par -V/Q scan\par -Plan for RHC at [facility] on July 13th.\par -F/u in clinic July 19th at 11am.\par -If there is precapillary PH will also puruse PFTs/6MWT; low suspicion for WHO Group III PH

## 2023-07-07 LAB
CCP AB SER IA-ACNC: <8 UNITS
DSDNA AB SER-ACNC: 22 IU/ML
RF+CCP IGG SER-IMP: NEGATIVE

## 2023-07-10 ENCOUNTER — NON-APPOINTMENT (OUTPATIENT)
Age: 78
End: 2023-07-10

## 2023-07-10 LAB
ANA SER IF-ACNC: NEGATIVE
ENA RNP AB SER IA-ACNC: 0.2 AL
ENA SCL70 IGG SER IA-ACNC: <0.2 AL

## 2023-07-12 ENCOUNTER — RESULT REVIEW (OUTPATIENT)
Age: 78
End: 2023-07-12

## 2023-07-12 VITALS
HEIGHT: 71 IN | OXYGEN SATURATION: 97 % | HEART RATE: 48 BPM | TEMPERATURE: 97 F | RESPIRATION RATE: 16 BRPM | DIASTOLIC BLOOD PRESSURE: 72 MMHG | WEIGHT: 179.9 LBS | SYSTOLIC BLOOD PRESSURE: 175 MMHG

## 2023-07-12 RX ORDER — CHLORHEXIDINE GLUCONATE 213 G/1000ML
1 SOLUTION TOPICAL ONCE
Refills: 0 | Status: DISCONTINUED | OUTPATIENT
Start: 2023-07-13 | End: 2023-07-27

## 2023-07-12 NOTE — H&P ADULT - NSHPLABSRESULTS_GEN_ALL_CORE
LABS:                          10.8   5.43  )-----------( 178      ( 13 Jul 2023 07:16 )             34.2     07-13    136  |  98  |  26<H>  ----------------------------<  270<H>  5.4<H>   |  28  |  1.02    Ca    9.8      13 Jul 2023 07:16  Mg     1.9     07-13    TPro  7.1  /  Alb  4.2  /  TBili  0.2  /  DBili  x   /  AST  21  /  ALT  22  /  AlkPhos  143<H>  07-13    LIVER FUNCTIONS - ( 13 Jul 2023 07:16 )  Alb: 4.2 g/dL / Pro: 7.1 g/dL / ALK PHOS: 143 U/L / ALT: 22 U/L / AST: 21 U/L / GGT: x           PT/INR - ( 13 Jul 2023 07:16 )   PT: 11.7 sec;   INR: 0.98          PTT - ( 13 Jul 2023 07:16 )  PTT:35.7 sec  Urinalysis Basic - ( 13 Jul 2023 07:16 )    Color: x / Appearance: x / SG: x / pH: x  Gluc: 270 mg/dL / Ketone: x  / Bili: x / Urobili: x   Blood: x / Protein: x / Nitrite: x   Leuk Esterase: x / RBC: x / WBC x   Sq Epi: x / Non Sq Epi: x / Bacteria: x LABS:                          10.8   5.43  )-----------( 178      ( 13 Jul 2023 07:16 )             34.2     07-13    136  |  98  |  26<H>  ----------------------------<  270<H>  5.4<H>   |  28  |  1.02    Ca    9.8      13 Jul 2023 07:16  Mg     1.9     07-13    TPro  7.1  /  Alb  4.2  /  TBili  0.2  /  DBili  x   /  AST  21  /  ALT  22  /  AlkPhos  143<H>  07-13    LIVER FUNCTIONS - ( 13 Jul 2023 07:16 )  Alb: 4.2 g/dL / Pro: 7.1 g/dL / ALK PHOS: 143 U/L / ALT: 22 U/L / AST: 21 U/L / GGT: x           PT/INR - ( 13 Jul 2023 07:16 )   PT: 11.7 sec;   INR: 0.98          PTT - ( 13 Jul 2023 07:16 )  PTT:35.7 sec  Urinalysis Basic - ( 13 Jul 2023 07:16 )    Color: x / Appearance: x / SG: x / pH: x  Gluc: 270 mg/dL / Ketone: x  / Bili: x / Urobili: x   Blood: x / Protein: x / Nitrite: x   Leuk Esterase: x / RBC: x / WBC x   Sq Epi: x / Non Sq Epi: x / Bacteria: x    EKG 7/13/23: sinus bradycardia 43 bpm, no acute changes from prior.

## 2023-07-12 NOTE — H&P ADULT - HISTORY OF PRESENT ILLNESS
Cardiologist:  Pharmacy:  Escort:    77-year-old female with PMHx pHTN (suspected WHO Group II), severe labile essential HTN, CIDP, JUDY requiring transfusions, asthma, CAD, bipolar disorder, DM, GBS (2021), aortic stenosis, hiatal hernia, neurogenic bladder, seizures, who presented to pulmonologist reporting dyspnea after walking approximately 1/2 mile _____ further symptoms____. Patient ___ denies CP, SOB, dizziness, palpitations, orthopnea/PND, leg swelling, LOC, bleeding, melena/hematochezia, fever, chills, URI symptoms, or recent illness.     TTE (6/13/2023): mild LVH, LVEF 35%, GIIDD, RV systolic function borderline reduced, LA chamber severely increased with LA volume index 49 ml/m2, RA chamber dimension enlarged, moderate AV sclerosis, mild-to-moderate AV stenosis with peak gradient 26 mmHg, mean gradient of 13 mmHg, AV area 0.90 cm2, trace AR, trace MR, moderate TR, severe pHTN, PASP 98 mmHg, trace PA.    In light of risk factors, rapid rise in PASP and new abnormal findings of RV on TTE, patient is referred to [facility] for RHC with vasodilator challenge.  Cardiologist: Dr. Corrales   Pulmonologist: Dr. Rogers   Pharmacy:  Escort:    77-year-old female with PMHx pHTN (suspected WHO Group II), severe labile essential HTN, CIDP, JUDY requiring transfusions, asthma, CAD, bipolar disorder, DM, GBS (2021), aortic stenosis, hiatal hernia, neurogenic bladder, seizures, who presented to pulmonologist reporting dyspnea after walking approximately 1/2 mile _____ [further symptoms]____. Patient ___ denies CP, SOB, dizziness, palpitations, orthopnea/PND, leg swelling, LOC, bleeding, melena/hematochezia, fever, chills, URI symptoms, or recent illness.     TTE, per pulmonology office note (6/13/2023): mild LVH, LVEF 35%, GIIDD, RV systolic function borderline reduced, LA chamber severely increased with LA volume index 49 ml/m2, RA chamber dimension enlarged, moderate AV sclerosis, mild-to-moderate AV stenosis with peak gradient 26 mmHg, mean gradient of 13 mmHg, AV area 0.90 cm2, trace AR, trace MR, moderate TR, severe pHTN, PASP 98 mmHg, trace [place].    In light of risk factors, rapid rise in PASP and new abnormal findings of RV on TTE, patient is referred to [facility] for RHC with vasodilator challenge.  Cardiologist: Dr. Corrales   Pulmonologist: Dr. Rogers   Pharmacy:  Escort:    77-year-old female with PMHx, mild-to-mod AS, pHTN (suspected WHO Group II), severe labile essential HTN, CIDP, JUDY requiring transfusions, asthma, CAD, bipolar disorder, DM, GBS (2021), hiatal hernia, neurogenic bladder, seizures, who presented to pulmonologist reporting dyspnea after walking approximately 1/2 mile _____ [further symptoms]____. Patient ___ denies CP, SOB, dizziness, palpitations, orthopnea/PND, leg swelling, LOC, bleeding, melena/hematochezia, fever, chills, URI symptoms, or recent illness.     TTE, per pulmonology office note (6/13/2023): mild LVH, LVEF 35%, GIIDD, RV systolic function borderline reduced, LA chamber severely increased with LA volume index 49 ml/m2, RA chamber dimension enlarged, moderate AV sclerosis, mild-to-moderate AV stenosis with peak gradient 26 mmHg, mean gradient of 13 mmHg, AV area 0.90 cm2, trace AR, trace MR, moderate TR, severe pHTN, PASP 98 mmHg, trace [place].    In light of risk factors, rapid rise in PASP and new abnormal findings of RV on TTE, patient is referred to [facility] for RHC with vasodilator challenge.  Cardiologist: Dr. Corrales   Pulmonologist: Dr. Gutierres   Pharmacy:  Escort:    77-year-old male with PMHx, mild-to-mod AS, pHTN (suspected WHO Group II), severe labile essential HTN, CIDP, JUDY requiring transfusions, asthma, CAD, bipolar disorder, DM, GBS (2021), hiatal hernia, neurogenic bladder, seizures, who presented to pulmonologist reporting dyspnea after walking approximately 1/2 mile. Patient feels some chest heaviness, but does note describe it as pain, occasional dizziness not a/w exertion, intermittent edema, no palpitations, orthopnea/PND, LOC, bleeding, melena/hematochezia, fever, chills, URI symptoms, or recent illness.     TTE, per pulmonology office note (6/13/2023): mild LVH, LVEF 35%, GIIDD, RV systolic function borderline reduced, LA chamber severely increased with LA volume index 49 ml/m2, RA chamber dimension enlarged, moderate AV sclerosis, mild-to-moderate AV stenosis with peak gradient 26 mmHg, mean gradient of 13 mmHg, AV area 0.90 cm2, trace AR, trace MR, moderate TR, severe pHTN, PASP 98 mmHg, trace CT.    In light of risk factors, rapid rise in PASP and new abnormal findings of RV on TTE, patient is referred to [facility] for RHC with vasodilator challenge.  Cardiologist: Dr. Corrales   Pulmonologist: Dr. Gutierres   Pharmacy: [facility], [address] [place] [id number]  Escort: assistant     77-year-old male with PMHx, mild-to-mod AS, pHTN (suspected WHO Group II), severe labile essential HTN, CIDP, JUDY requiring transfusions, asthma, CAD, bipolar disorder, DM, GBS (2021), hiatal hernia, neurogenic bladder, seizures, who presented to pulmonologist reporting dyspnea and chest pressure at rest along with lower extremity swelling as well. Patient denies dizziness, palpitations, orthopnea/PND, LOC, bleeding, melena/hematochezia, fever, chills, URI symptoms, or recent illness.    TTE, per pulmonology office note (6/13/2023): mild LVH, LVEF 35%, GIIDD, RV systolic function borderline reduced, LA chamber severely increased with LA volume index 49 ml/m2, RA chamber dimension enlarged, moderate AV sclerosis, mild-to-moderate AV stenosis with peak gradient 26 mmHg, mean gradient of 13 mmHg, AV area 0.90 cm2, trace AR, trace MR, moderate TR, severe pHTN, PASP 98 mmHg, trace [place].    In light of risk factors, rapid rise in PASP and new abnormal findings of RV on TTE, patient is referred to [facility] for RHC with vasodilator challenge.

## 2023-07-12 NOTE — H&P ADULT - NSICDXPASTMEDICALHX_GEN_ALL_CORE_FT
PAST MEDICAL HISTORY:  Barretts esophagus     Bipolar disorder     BPH (benign prostatic hyperplasia)     CAD (coronary artery disease)     Guillain-[place] syndrome     H/O aplastic anemia     History of asthma     History of hiatal hernia     HLD (hyperlipidemia)     HTN (hypertension)     Migraines     Neurogenic bladder     Peripheral neuropathy     RBBB     Seizure disorder      PAST MEDICAL HISTORY:  Aortic stenosis     Barretts esophagus     Bipolar disorder     BPH (benign prostatic hyperplasia)     CAD (coronary artery disease)     Guillain-[place] syndrome     H/O aplastic anemia     History of asthma     History of hiatal hernia     HLD (hyperlipidemia)     HTN (hypertension)     Migraines     Neurogenic bladder     Peripheral neuropathy     RBBB     Seizure disorder

## 2023-07-12 NOTE — H&P ADULT - ASSESSMENT
77-year-old male with PMHx, mild-to-mod AS, pHTN (suspected WHO Group II), severe labile essential HTN, CIDP, JUDY requiring transfusions, asthma, CAD, bipolar disorder, DM, GBS (2021), hiatal hernia, neurogenic bladder, seizures, who presents to [facility] for RHC with vasodilator challenge light of risk factors, rapid rise in PASP and new abnormal findings of RV on TTE.    ASA II	Mallampati class: I       - VS s/f /72 mmHg; pt states BP labile, MD at bedside and aware  - H/H 10.8/34.2, patient denies BRBPR, melena, hematuria, or further bleeding.   -  Sedation Plan:   [  ] None   [ x ] Moderate   [  ]  Deep    [  ]  General Anesthesia   - Patient Is Suitable Candidate For Sedation?     [ x ] Yes   [  ] No   [  ] Not Applicable   - Cath Order Entered: [ x ] Yes  - DAPT LOAD Ordered: [  ] Yes  [ x ] No load as patient is only undergoing RHC.  - PRE-CATH FLUIDS:  [  x] Not indicated as patient is undergoing RHC.  - ALLERGY: [x ] no inidication for pre-cath steroid prophylaxis   - Informed consent is in the patient's chart.    Risks Benefits Annotation:     CARDIAC CATH: Risks & benefits of procedure and sedation and risks and benefits for the alternative therapy have been explained to the patient and/or HCP in layman’s terms including but not limited to: allergic reaction, bleeding, infection, arrhythmia, respiratory compromise, renal and vascular compromise, limb damage, MI, CVA, emergent CABG/Vascular Surgery and death. Informed consent obtained and in chart.     77-year-old male with PMHx, mild-to-mod AS, pHTN (suspected WHO Group II), severe labile essential HTN, CIDP, JUDY requiring transfusions, asthma, CAD, bipolar disorder, DM, GBS (2021), hiatal hernia, neurogenic bladder, seizures, who presents to [facility] for RHC with vasodilator challenge light of risk factors, rapid rise in PASP and new abnormal findings of RV on TTE.    ASA II	Mallampati class: I       - VS s/f /72 mmHg; pt states BP labile; HR 48 bpm; Dr. Rogers and fellow aware and reviewed EKG. VS otherwise stable.   - H/H 10.8/34.2, patient denies BRBPR, melena, hematuria, or further bleeding.   -  Sedation Plan:   [  ] None   [ x ] Moderate   [  ]  Deep    [  ]  General Anesthesia   - Patient Is Suitable Candidate For Sedation?     [ x ] Yes   [  ] No   [  ] Not Applicable   - Cath Order Entered: [ x ] Yes  - DAPT LOAD Ordered: [  ] Yes  [ x ] No load as patient is only undergoing RHC.  - PRE-CATH FLUIDS:  [  x] Not indicated as patient is undergoing RHC.  - ALLERGY: [x ] no indication for pre-cath steroid prophylaxis   - Informed consent is in the patient's chart.  - Glucose 270; fellow/MD aware and confirm that ISS not required at this time     Risks Benefits Annotation:     RIGHT HEART CATH: Risks & benefits of procedure and sedation and risks and benefits for the alternative therapy have been explained to the patient and/or HCP in layman’s terms including but not limited to: allergic reaction, bleeding, infection, arrhythmia, respiratory compromise, renal and vascular compromise, limb damage, MI, CVA, emergent CABG/Vascular Surgery and death. Informed consent obtained and in chart.

## 2023-07-12 NOTE — H&P ADULT - NS PANP COMMENT GEN_ALL_CORE FT
Pt w/ severe PH noted on TTE a/w FRANCOIS and chest heaviness, RF for WHO Group II and V, less likely WHO Group I, III, IV. CT chest reviewed, no obvious abnormalities aside from large hiatal hernia. Plan for RHC to further delineate hemodynamics and characterize/confirm PH. All questions answered, patient's vitals stable, labs noted, proceed w/ RHC.

## 2023-07-13 ENCOUNTER — NON-APPOINTMENT (OUTPATIENT)
Age: 78
End: 2023-07-13

## 2023-07-13 ENCOUNTER — OUTPATIENT (OUTPATIENT)
Dept: OUTPATIENT SERVICES | Facility: HOSPITAL | Age: 78
LOS: 1 days | Discharge: HOME | End: 2023-07-13
Payer: MEDICARE

## 2023-07-13 LAB
A1C WITH ESTIMATED AVERAGE GLUCOSE RESULT: 6.7 % — HIGH (ref 4–5.6)
ALBUMIN SERPL ELPH-MCNC: 4.2 G/DL — SIGNIFICANT CHANGE UP (ref 3.3–5)
ALP SERPL-CCNC: 143 U/L — HIGH (ref 40–120)
ALT FLD-CCNC: 22 U/L — SIGNIFICANT CHANGE UP (ref 10–45)
ANION GAP SERPL CALC-SCNC: 10 MMOL/L — SIGNIFICANT CHANGE UP (ref 5–17)
APTT BLD: 35.7 SEC — HIGH (ref 27.5–35.5)
AST SERPL-CCNC: 21 U/L — SIGNIFICANT CHANGE UP (ref 10–40)
BASE EXCESS BLDV CALC-SCNC: 1.1 MMOL/L — SIGNIFICANT CHANGE UP (ref -2–3)
BASOPHILS # BLD AUTO: 0.06 K/UL — SIGNIFICANT CHANGE UP (ref 0–0.2)
BASOPHILS NFR BLD AUTO: 1.1 % — SIGNIFICANT CHANGE UP (ref 0–2)
BILIRUB SERPL-MCNC: 0.2 MG/DL — SIGNIFICANT CHANGE UP (ref 0.2–1.2)
BUN SERPL-MCNC: 26 MG/DL — HIGH (ref 7–23)
CA-I SERPL-SCNC: 1.34 MMOL/L — HIGH (ref 1.15–1.33)
CALCIUM SERPL-MCNC: 9.8 MG/DL — SIGNIFICANT CHANGE UP (ref 8.4–10.5)
CHLORIDE SERPL-SCNC: 98 MMOL/L — SIGNIFICANT CHANGE UP (ref 96–108)
CHOLEST SERPL-MCNC: 201 MG/DL — HIGH
CK MB CFR SERPL CALC: 3.6 NG/ML — SIGNIFICANT CHANGE UP (ref 0–6.7)
CK SERPL-CCNC: 99 U/L — SIGNIFICANT CHANGE UP (ref 30–200)
CO2 BLDV-SCNC: 32.6 MMOL/L — HIGH (ref 22–26)
CO2 SERPL-SCNC: 28 MMOL/L — SIGNIFICANT CHANGE UP (ref 22–31)
COHGB MFR BLDV: 0.2 % — SIGNIFICANT CHANGE UP
COHGB MFR BLDV: 1.6 % — SIGNIFICANT CHANGE UP
COHGB MFR BLDV: 1.7 % — SIGNIFICANT CHANGE UP
COHGB MFR BLDV: 1.7 % — SIGNIFICANT CHANGE UP
CREAT SERPL-MCNC: 1.02 MG/DL — SIGNIFICANT CHANGE UP (ref 0.5–1.3)
EGFR: 76 ML/MIN/1.73M2 — SIGNIFICANT CHANGE UP
EOSINOPHIL # BLD AUTO: 0.25 K/UL — SIGNIFICANT CHANGE UP (ref 0–0.5)
EOSINOPHIL NFR BLD AUTO: 4.6 % — SIGNIFICANT CHANGE UP (ref 0–6)
ESTIMATED AVERAGE GLUCOSE: 146 MG/DL — HIGH (ref 68–114)
GAS PNL BLDV: 137 MMOL/L — SIGNIFICANT CHANGE UP (ref 136–145)
GLUCOSE BLDC GLUCOMTR-MCNC: 267 MG/DL — HIGH (ref 70–99)
GLUCOSE BLDV-MCNC: 267 MG/DL — HIGH (ref 70–99)
GLUCOSE SERPL-MCNC: 270 MG/DL — HIGH (ref 70–99)
HCO3 BLDV-SCNC: 30 MMOL/L — HIGH (ref 22–29)
HCT VFR BLD CALC: 34.2 % — LOW (ref 39–50)
HCT VFR BLDA CALC: 29 % — SIGNIFICANT CHANGE UP
HCT VFR BLDA CALC: 30 % — SIGNIFICANT CHANGE UP
HCT VFR BLDA CALC: 30 % — SIGNIFICANT CHANGE UP
HCT VFR BLDA CALC: 33 % — SIGNIFICANT CHANGE UP
HDLC SERPL-MCNC: 84 MG/DL — SIGNIFICANT CHANGE UP
HGB BLD CALC-MCNC: 10.9 G/DL — LOW (ref 12.6–17.4)
HGB BLD CALC-MCNC: 9.7 G/DL — LOW (ref 12.6–17.4)
HGB BLD CALC-MCNC: 9.9 G/DL — LOW (ref 12.6–17.4)
HGB BLD CALC-MCNC: 9.9 G/DL — LOW (ref 12.6–17.4)
HGB BLD-MCNC: 10.8 G/DL — LOW (ref 13–17)
IMM GRANULOCYTES NFR BLD AUTO: 1.1 % — HIGH (ref 0–0.9)
INR BLD: 0.98 — SIGNIFICANT CHANGE UP (ref 0.88–1.16)
ISTAT INR: 1.4 — HIGH (ref 0.88–1.16)
ISTAT PT: 16.1 SEC — HIGH (ref 10–12.9)
LIPID PNL WITH DIRECT LDL SERPL: 95 MG/DL — SIGNIFICANT CHANGE UP
LYMPHOCYTES # BLD AUTO: 1.25 K/UL — SIGNIFICANT CHANGE UP (ref 1–3.3)
LYMPHOCYTES # BLD AUTO: 23 % — SIGNIFICANT CHANGE UP (ref 13–44)
MAGNESIUM SERPL-MCNC: 1.9 MG/DL — SIGNIFICANT CHANGE UP (ref 1.6–2.6)
MCHC RBC-ENTMCNC: 30.2 PG — SIGNIFICANT CHANGE UP (ref 27–34)
MCHC RBC-ENTMCNC: 31.6 GM/DL — LOW (ref 32–36)
MCV RBC AUTO: 95.5 FL — SIGNIFICANT CHANGE UP (ref 80–100)
METHGB MFR BLDV: 0 % — SIGNIFICANT CHANGE UP
METHGB MFR BLDV: 0.3 % — SIGNIFICANT CHANGE UP
METHGB MFR BLDV: 0.3 % — SIGNIFICANT CHANGE UP
METHGB MFR BLDV: 0.4 % — SIGNIFICANT CHANGE UP
MONOCYTES # BLD AUTO: 0.51 K/UL — SIGNIFICANT CHANGE UP (ref 0–0.9)
MONOCYTES NFR BLD AUTO: 9.4 % — SIGNIFICANT CHANGE UP (ref 2–14)
NEUTROPHILS # BLD AUTO: 3.3 K/UL — SIGNIFICANT CHANGE UP (ref 1.8–7.4)
NEUTROPHILS NFR BLD AUTO: 60.8 % — SIGNIFICANT CHANGE UP (ref 43–77)
NON HDL CHOLESTEROL: 117 MG/DL — SIGNIFICANT CHANGE UP
NRBC # BLD: 0 /100 WBCS — SIGNIFICANT CHANGE UP (ref 0–0)
PCO2 BLDV: 74 MMHG — HIGH (ref 42–55)
PH BLDV: 7.22 — LOW (ref 7.32–7.43)
PLATELET # BLD AUTO: 178 K/UL — SIGNIFICANT CHANGE UP (ref 150–400)
PO2 BLDV: <33 MMHG — LOW (ref 25–45)
POCT ISTAT CREATININE: 1.1 MG/DL — SIGNIFICANT CHANGE UP (ref 0.5–1.3)
POTASSIUM BLDV-SCNC: 5 MMOL/L — SIGNIFICANT CHANGE UP (ref 3.5–5.1)
POTASSIUM SERPL-MCNC: 5.4 MMOL/L — HIGH (ref 3.5–5.3)
POTASSIUM SERPL-SCNC: 5.4 MMOL/L — HIGH (ref 3.5–5.3)
PROT SERPL-MCNC: 7.1 G/DL — SIGNIFICANT CHANGE UP (ref 6–8.3)
PROTHROM AB SERPL-ACNC: 11.7 SEC — SIGNIFICANT CHANGE UP (ref 10.5–13.4)
RBC # BLD: 3.58 M/UL — LOW (ref 4.2–5.8)
RBC # FLD: 14.6 % — HIGH (ref 10.3–14.5)
SAO2 % BLDV: 21 % — LOW (ref 67–88)
SAO2 % BLDV: 65 % — LOW (ref 67–88)
SAO2 % BLDV: 66 % — LOW (ref 67–88)
SAO2 % BLDV: 69 % — SIGNIFICANT CHANGE UP (ref 67–88)
SODIUM SERPL-SCNC: 136 MMOL/L — SIGNIFICANT CHANGE UP (ref 135–145)
TRIGL SERPL-MCNC: 108 MG/DL — SIGNIFICANT CHANGE UP
WBC # BLD: 5.43 K/UL — SIGNIFICANT CHANGE UP (ref 3.8–10.5)
WBC # FLD AUTO: 5.43 K/UL — SIGNIFICANT CHANGE UP (ref 3.8–10.5)

## 2023-07-13 PROCEDURE — 83735 ASSAY OF MAGNESIUM: CPT

## 2023-07-13 PROCEDURE — 93005 ELECTROCARDIOGRAM TRACING: CPT

## 2023-07-13 PROCEDURE — 85730 THROMBOPLASTIN TIME PARTIAL: CPT

## 2023-07-13 PROCEDURE — 83036 HEMOGLOBIN GLYCOSYLATED A1C: CPT

## 2023-07-13 PROCEDURE — 82550 ASSAY OF CK (CPK): CPT

## 2023-07-13 PROCEDURE — 82962 GLUCOSE BLOOD TEST: CPT

## 2023-07-13 PROCEDURE — C1889: CPT

## 2023-07-13 PROCEDURE — 80061 LIPID PANEL: CPT

## 2023-07-13 PROCEDURE — 85610 PROTHROMBIN TIME: CPT

## 2023-07-13 PROCEDURE — 82565 ASSAY OF CREATININE: CPT

## 2023-07-13 PROCEDURE — C1887: CPT

## 2023-07-13 PROCEDURE — 99152 MOD SED SAME PHYS/QHP 5/>YRS: CPT

## 2023-07-13 PROCEDURE — 85025 COMPLETE CBC W/AUTO DIFF WBC: CPT

## 2023-07-13 PROCEDURE — 36415 COLL VENOUS BLD VENIPUNCTURE: CPT

## 2023-07-13 PROCEDURE — 99153 MOD SED SAME PHYS/QHP EA: CPT

## 2023-07-13 PROCEDURE — 93451 RIGHT HEART CATH: CPT

## 2023-07-13 PROCEDURE — 80053 COMPREHEN METABOLIC PANEL: CPT

## 2023-07-13 PROCEDURE — 82803 BLOOD GASES ANY COMBINATION: CPT

## 2023-07-13 PROCEDURE — 93010 ELECTROCARDIOGRAM REPORT: CPT

## 2023-07-13 PROCEDURE — C1894: CPT

## 2023-07-13 PROCEDURE — 82553 CREATINE MB FRACTION: CPT

## 2023-07-13 RX ORDER — SODIUM ZIRCONIUM CYCLOSILICATE 10 G/10G
10 POWDER, FOR SUSPENSION ORAL ONCE
Refills: 0 | Status: DISCONTINUED | OUTPATIENT
Start: 2023-07-13 | End: 2023-07-27

## 2023-07-13 RX ORDER — MAGNESIUM OXIDE 400 MG ORAL TABLET 241.3 MG
400 TABLET ORAL ONCE
Refills: 0 | Status: COMPLETED | OUTPATIENT
Start: 2023-07-13 | End: 2023-07-13

## 2023-07-13 NOTE — PROGRESS NOTE ADULT - SUBJECTIVE AND OBJECTIVE BOX
Interventional Cardiology PA SDA Discharge Note    Patient without complaints. Ambulated and voided without difficulties    Afebrile, VSS    Ext:    		Right IJ: No hematoma, no bruit, dressing: CDI     A/P:    77-year-old male with PMHx, mild-to-mod AS, pHTN (suspected WHO Group II), severe labile essential HTN, CIDP, JUDY requiring transfusions, asthma, CAD, bipolar disorder, DM, GBS (2021), hiatal hernia, neurogenic bladder, seizures, who presented to pulmonologist reporting dyspnea after walking approximately 1/2 mile. Patient feels some chest heaviness, but does note describe it as pain, occasional dizziness not a/w exertion, intermittent edema, no palpitations, orthopnea/PND, LOC, bleeding, melena/hematochezia, fever, chills, URI symptoms, or recent illness. TTE, per pulmonology office note (6/13/2023): mild LVH, LVEF 35%, GIIDD, RV systolic function borderline reduced, LA chamber severely increased with LA volume index 49 ml/m2, RA chamber dimension enlarged, moderate AV sclerosis, mild-to-moderate AV stenosis with peak gradient 26 mmHg, mean gradient of 13 mmHg, AV area 0.90 cm2, trace AR, trace MR, moderate TR, severe pHTN, PASP 98 mmHg, trace [place]. In light of risk factors, rapid rise in PASP and new abnormal findings of RV on TTE, patient is referred to [facility] for RHC with vasodilator challenge.     Patient now s/p RHC: mean pulm artery pressure 26, wedge pressure 15-16, PVR 2-3. Findings consistent with post capillary disease, will need tight blood pressure control.        1.	Stable for discharge as per attending Dr. Gutierres after bed rest, pt voids, groin/wrist stable and 30 minutes of ambulation.  2.	Follow-up with PMD/Cardiologist Dr Corrales in 1-2 weeks  3.	Discharged forms signed and copies in chart

## 2023-07-14 ENCOUNTER — APPOINTMENT (OUTPATIENT)
Dept: PULMONOLOGY | Facility: CLINIC | Age: 78
End: 2023-07-14

## 2023-07-14 DIAGNOSIS — I27.20 PULMONARY HYPERTENSION, UNSPECIFIED: ICD-10-CM

## 2023-07-18 RX ORDER — CARVEDILOL 12.5 MG/1
12.5 TABLET, FILM COATED ORAL
Qty: 180 | Refills: 3 | Status: ACTIVE | COMMUNITY
Start: 2022-10-18 | End: 1900-01-01

## 2023-07-18 RX ORDER — LOSARTAN POTASSIUM 50 MG/1
50 TABLET, FILM COATED ORAL
Qty: 270 | Refills: 3 | Status: ACTIVE | COMMUNITY
Start: 2023-07-18 | End: 1900-01-01

## 2023-07-18 RX ORDER — SPIRONOLACTONE 25 MG/1
25 TABLET ORAL
Qty: 90 | Refills: 3 | Status: ACTIVE | COMMUNITY
Start: 2023-07-17

## 2023-07-19 ENCOUNTER — APPOINTMENT (OUTPATIENT)
Dept: PULMONOLOGY | Facility: CLINIC | Age: 78
End: 2023-07-19
Payer: MEDICARE

## 2023-07-19 VITALS
HEIGHT: 75 IN | SYSTOLIC BLOOD PRESSURE: 128 MMHG | OXYGEN SATURATION: 97 % | TEMPERATURE: 95.9 F | HEART RATE: 55 BPM | BODY MASS INDEX: 22.38 KG/M2 | WEIGHT: 180 LBS | DIASTOLIC BLOOD PRESSURE: 70 MMHG

## 2023-07-19 PROCEDURE — 99215 OFFICE O/P EST HI 40 MIN: CPT

## 2023-07-20 NOTE — PROCEDURE
[FreeTextEntry1] : 7/13/23 RHC\par RA 8 \par RV 54/7 \par PA 51/17/26 \par PCWP 16 \par PA sat   66%   Ao Sat 89% (room air) \par CO/CI (Td) 5.63/2.80 \par CO/CI (Tran) 6.4/3.17 \par TPG 10 \par DPG  1 \par PVR 1.77 \par SVR 17.85 (1,428dynes) \par PVR/SVR=0.1 \par \par Impression: Mild post-capillary pulmonary hypertension. Significantly\par elevated SBP and MAP. Normal PVR/SVR ratio, normal\par DPG, normal TPG not consistent with precapillary disease, likely\par supports post capillary disease 2/2 systemic HTN. Normal RA\par filling pressures, no indication for diuresis at this time.\par *****\par 7/12/23 CXR\par Minimal bilateral pleural effusions and left retrocardiac subsegmental atelectasis.   Cardiomegaly.   \par Hiatus hernia\par *****\par 7/12/23 V/Q SCAN\par Low probability for pulmonary embolism. Markedly decreased perfusion and ventilation in the entire left lung.\par *****\par 7/5/23 LABS\par HIV non reactive\par A1c 6.8%\par Pro \par Hgb 10.8  Hct 35.5\par Rheumatoid Factor 24 (H)\par Uric Acid WNL\par C3, C4, anti CCP, ds DNA WNl\par ****\par 6/13/23 ECHO\par   1. Heart is tilted downward, making imaging a bit problematic.  \par    2. Left ventricular chamber dimension is normal.  \par    3. There is mild left ventricular hypertrophy.  \par    4. Left ventricular systolic function is normal  with an estimated ejection  fraction of 53 % by 2D.  \par    5. The left ventricle demonstrates grade II diastolic dysfunction..  \par    6. Right ventricular seems foreshortened but dimension is grossly normal.  \par    7. Right ventricular systolic function is borderline reduced.  \par    8. Left atrial chamber is severely increased with a left atrial volume index of 49 ml/m2..  \par    9. Right atrial chamber dimension is enlarged.  \par    10. There is moderate aortic valve sclerosis.  \par    11. There is mild to moderate aortic valve stenosis with a peak gradient of 26 mmHg, mean gradient of 13 mmHg, and aortic valve area of 0.90 cm2.  \par    12. There is trace aortic valve regurgitation.  \par    13. The mitral valve has thickened leaflets.  \par    14. There is trace mitral valve regurgitation.  \par    15. There is moderate tricuspid valve regurgitation.  \par    16. Severe pulmonary hypertension, estimated pulmonary arterial systolic pressure is 98 mmHg.  \par    17. There is trace pulmonic regurgitation.  \par *****\par 2/27/23 Myocardial perfusion Scan\par No chest pain or ECG changes with regadenoson stress.  \par  No evidence of ischemia or infarct per myocardial perfusion imaging with likely diaphragmatic \par attenuation artifact.  \par  Normal left ventricular wall motion.  \par  Calculated left ventricular ejection fraction was 65%.\par *****\par 10/11/22 ECHO\par 1. Normal left ventricular size and systolic function, LVEF 56%. \par 2. Moderate (Grade 2) left ventricular diastolic dysfunction. \par 3. Mild concentric left ventricular hypertrophy. \par 4. Severely dilated left atrium. \par 5. Mild - to - moderate aortic stenosis (ARNOLD 1.11 cm2, mean gradient 10.9 mmHg, DVI 0.27). \par 6. Mild mitral regurgitation. \par 7. Moderate tricuspid regurgitation. \par 8. Mild pulmonary hypertension with PASP 48 mmHg\par *****\par 7/26/21 CTA CHEST\par Large hiatal hernia deviates the heart which appears upper limits of normal in size.  \par Areas of left lung infiltrate/atelectasis. Pneumonia cannot be excluded.  \par Small right layering pleural effusion.\par *****\par

## 2023-07-20 NOTE — HISTORY OF PRESENT ILLNESS
[TextBox_4] : 77 year old man with severe labile essential hypertension, CIDP, iron deficiency anemia requiring infusions, asthma, CAD, bipolar disorder, diabetes mellitus, Guillan-Washington syndrome in 2021, aortic stenosis, hiatal hernia, neurogenic bladder, seizures.  Echo 6/2023 Severe pulmonary hypertension was reflected by pulmonary artery systolic pressure of 98 mmHg, for which the patient was referred by cardiology Dr. Corrales. October 2022 PASP 48. and June 2023 PASP 98mmHg.\par \par Mentions shortness of breath after 0.7 miles. Sleeps on 1 pillow at night. Denies orthopnea. Mentions that he had a sleep study and was ruled out for apnea. However he is concerned that he may have developed sleep apnea. Was on modafinil in the remote past but is currently not on it.Takes horny goat weed. Feels fatigued too. Denies syncope, chest pain, palpitations. Does have "dizziness" although describes more balance issues and requires ambulating with a cane. Has not noticed decreased exercise tolerance due to SOB, more due to leg weakness. Is afraid that if he did try to exert himself more he would be limited 2/2 breathing. He has severely labile blood pressure - in the evenings tends to be hypertensive to SBP >200, but can drop low during the day. Now s/p RHC for evaluation of pulmonary hypertension. Found to have mild PH, post capillary, with normal PVR and significantly elevated SVR. Results consistent with mild PH 2/2 systemic hypertension.\par \par PH Risk factors:\par FH sudden cardiac death: (-)\par IVDU or substance use including alcohol :(-)\par Anorexigens :(-)\par Amphetamines :(-)\par Rheumatological dx :(-)\par Congenital heart dx :(-)\par HTN,Afib, diastolic dysfunction, large LA, valvular dx :(+ valvular disease, +severe HTN, +CAD)\par Metabolic syndrome including obesity :(+ diabetes)\par Parenchymal lung dx :(-)\par NARCISA :(-)\par Hx of DVT or PE: (-)\par Hx of splenectomy :(-)\par Liver dx :(-)\par Kidney dx :(-)\par Hematological/Malignancy/Anemia :(-+ iron deficiency anemia)\par HIV:(-)\par  \par 7-19-23\par Noted to have post-capillary disease based on RHC last week. Patient admits to having severe hypertension that is labile and is being worked on by his cardiologist. Dr. Corrales started patient on spironolactone 25 mg qd recently.

## 2023-07-20 NOTE — END OF VISIT
[FreeTextEntry3] : 76 yo M w/ history as above who was found more incidentally to have severe PH and abnormal RV, significantly altered from prior TTE in October 2022. No significant progression of symptoms, but may be limited by neurologic disorder. TTE most suggestive of WHO Group II disease, although severity of AS improved. Does have grade II DD. Work up for group I and IV PH negative. Now s/p RHC which confirms mild PH related to group II disease (mildly elevated PAWP, normal TPG, normal DPG, normal PVR, and low PVR/SVR ratio). Not a candidate for pulmonary vasodilators, continue ongoing management for diastolic dysfunction, HTN, valvular heart disease. Follow up in PH clinic as needed.

## 2023-07-20 NOTE — PHYSICAL EXAM
[Murmur ___ / 6] : murmur [unfilled] / 6 [2+ Pitting] : 2+ pitting [TextBox_2] : kyphosis and uses cane in left arm [TextBox_54] : LUSB grade IV/VI systolic ejection murmur

## 2023-07-20 NOTE — CONSULT LETTER
[FreeTextEntry2] : Dr. Fredy Corrales [FreeTextEntry3] : Rita Gutierres DO \par Director of Pulmonary Hypertension\par Department of Pulmonary and Critical Care\par [facility]

## 2023-07-20 NOTE — ASSESSMENT
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, severe labile HTN, chronic JUDY on IV iron infusions, asthma, CAD, DM, bipolar disorder who c/o exercise mostly limited by LE strength, but occasionally notices FRANCOIS and found on F/U TTE to have severe PH and abnormal RV size (previously w/ mild PH and normal RV size/function).\par \par Data reviewed: TTE (10/14/22), TTE (6/13/23), CTA PE 6/2021\par 7/13/23 RHC: RA 8, RV 54/7, PA 51/17/26, PCWP 16, PA sat   66%   Ao Sat 89% (room air), CO/CI (Td) 5.63/2.80 CO/CI (Tran) 6.4/3.17, TPG 10, DPG  1, PVR 1.77, SVR 17.85 (1,428dynes), PVR/SVR=0.1 \par \par Assessment:\par #Mild pulmonary hypertension; 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 Significantly elevated SBP and MAP. Normal PVR/SVR ratio, normal DPG, normal TPG not consistent with precapillary disease, likely supports post capillary disease 2/2 systemic HTN. Normal RA filling pressures, no indication for diuresis at this time.\par \par Plan:\par -No indication for pulmonary vasodilators, no further work up for pulmonary hypertension. TTE likely over estimated PASP. \par -Continued management for hypertension and diastolic dysfunction (grade II)\par -Consider repeat sleep study\par -Follow up with cardiologist (Dr. Corrales) and pulmonologist (Dr. Veloz) for ongoing intermittent chest tightness/FRANCOIS, not significantly limiting. \par -Can follow up in PH clinic PRN

## 2023-07-21 RX ORDER — CARVEDILOL 12.5 MG/1
12.5 TABLET, FILM COATED ORAL
Qty: 370 | Refills: 3 | Status: ACTIVE | COMMUNITY
Start: 2023-07-21 | End: 1900-01-01

## 2023-08-01 ENCOUNTER — APPOINTMENT (OUTPATIENT)
Dept: CARDIOLOGY | Facility: CLINIC | Age: 78
End: 2023-08-01

## 2023-08-15 RX ORDER — CARVEDILOL PHOSPHATE 80 MG/1
1 CAPSULE, EXTENDED RELEASE ORAL
Refills: 0 | DISCHARGE

## 2023-08-15 RX ORDER — DOXAZOSIN MESYLATE 4 MG
1 TABLET ORAL
Refills: 0 | DISCHARGE

## 2023-08-15 RX ORDER — CLONAZEPAM 1 MG
1 TABLET ORAL
Refills: 0 | DISCHARGE

## 2023-08-15 RX ORDER — ATORVASTATIN CALCIUM 80 MG/1
1 TABLET, FILM COATED ORAL
Refills: 0 | DISCHARGE

## 2023-08-15 RX ORDER — OXCARBAZEPINE 300 MG/1
1 TABLET, FILM COATED ORAL
Refills: 0 | DISCHARGE

## 2023-08-15 RX ORDER — PRIMIDONE 250 MG/1
1.5 TABLET ORAL
Refills: 0 | DISCHARGE

## 2023-08-15 RX ORDER — GABAPENTIN 400 MG/1
1 CAPSULE ORAL
Refills: 0 | DISCHARGE

## 2023-08-15 RX ORDER — PRAZOSIN HCL 2 MG
3.5 CAPSULE ORAL
Refills: 0 | DISCHARGE

## 2023-08-15 RX ORDER — LAMOTRIGINE 25 MG/1
1 TABLET, ORALLY DISINTEGRATING ORAL
Refills: 0 | DISCHARGE

## 2023-08-15 RX ORDER — BUPROPION HYDROCHLORIDE 150 MG/1
1 TABLET, EXTENDED RELEASE ORAL
Refills: 0 | DISCHARGE

## 2023-08-15 RX ORDER — SUMATRIPTAN SUCCINATE 4 MG/.5ML
1 INJECTION, SOLUTION SUBCUTANEOUS
Refills: 0 | DISCHARGE

## 2023-08-15 RX ORDER — OMEPRAZOLE 10 MG/1
1 CAPSULE, DELAYED RELEASE ORAL
Refills: 0 | DISCHARGE

## 2023-08-15 RX ORDER — FUROSEMIDE 40 MG
1 TABLET ORAL
Refills: 0 | DISCHARGE

## 2023-08-15 RX ORDER — LOSARTAN POTASSIUM 100 MG/1
1 TABLET, FILM COATED ORAL
Refills: 0 | DISCHARGE

## 2023-08-26 ENCOUNTER — NON-APPOINTMENT (OUTPATIENT)
Age: 78
End: 2023-08-26

## 2023-08-29 ENCOUNTER — NON-APPOINTMENT (OUTPATIENT)
Age: 78
End: 2023-08-29

## 2023-10-13 RX ORDER — CLONIDINE HYDROCHLORIDE 0.1 MG/1
0.1 TABLET ORAL
Qty: 270 | Refills: 3 | Status: ACTIVE | COMMUNITY
Start: 2023-02-07

## 2023-10-30 PROBLEM — G43.909 MIGRAINE, UNSPECIFIED, NOT INTRACTABLE, WITHOUT STATUS MIGRAINOSUS: Chronic | Status: ACTIVE | Noted: 2023-07-12

## 2023-10-30 PROBLEM — Z86.2 PERSONAL HISTORY OF DISEASES OF THE BLOOD AND BLOOD-FORMING ORGANS AND CERTAIN DISORDERS INVOLVING THE IMMUNE MECHANISM: Chronic | Status: ACTIVE | Noted: 2023-07-12

## 2023-10-30 PROBLEM — N31.9 NEUROMUSCULAR DYSFUNCTION OF BLADDER, UNSPECIFIED: Chronic | Status: ACTIVE | Noted: 2023-07-12

## 2023-10-30 PROBLEM — G61.0 GUILLAIN-BARRE SYNDROME: Chronic | Status: ACTIVE | Noted: 2023-07-12

## 2023-10-30 PROBLEM — I10 ESSENTIAL (PRIMARY) HYPERTENSION: Chronic | Status: ACTIVE | Noted: 2023-07-12

## 2023-10-30 PROBLEM — Z87.19 PERSONAL HISTORY OF OTHER DISEASES OF THE DIGESTIVE SYSTEM: Chronic | Status: ACTIVE | Noted: 2023-07-12

## 2023-10-30 PROBLEM — K22.70 BARRETT'S ESOPHAGUS WITHOUT DYSPLASIA: Chronic | Status: ACTIVE | Noted: 2023-07-12

## 2023-10-30 PROBLEM — Z87.09 PERSONAL HISTORY OF OTHER DISEASES OF THE RESPIRATORY SYSTEM: Chronic | Status: ACTIVE | Noted: 2023-07-12

## 2023-10-30 PROBLEM — G62.9 POLYNEUROPATHY, UNSPECIFIED: Chronic | Status: ACTIVE | Noted: 2023-07-12

## 2023-10-30 PROBLEM — I45.10 UNSPECIFIED RIGHT BUNDLE-BRANCH BLOCK: Chronic | Status: ACTIVE | Noted: 2023-07-12

## 2023-10-30 PROBLEM — E78.5 HYPERLIPIDEMIA, UNSPECIFIED: Chronic | Status: ACTIVE | Noted: 2023-07-12

## 2023-10-30 PROBLEM — F31.9 BIPOLAR DISORDER, UNSPECIFIED: Chronic | Status: ACTIVE | Noted: 2023-07-12

## 2023-10-30 PROBLEM — G40.909 EPILEPSY, UNSPECIFIED, NOT INTRACTABLE, WITHOUT STATUS EPILEPTICUS: Chronic | Status: ACTIVE | Noted: 2023-07-12

## 2023-10-30 PROBLEM — I35.0 NONRHEUMATIC AORTIC (VALVE) STENOSIS: Chronic | Status: ACTIVE | Noted: 2023-07-12

## 2023-10-30 PROBLEM — N40.0 BENIGN PROSTATIC HYPERPLASIA WITHOUT LOWER URINARY TRACT SYMPTOMS: Chronic | Status: ACTIVE | Noted: 2023-07-12

## 2023-10-30 PROBLEM — I25.10 ATHEROSCLEROTIC HEART DISEASE OF NATIVE CORONARY ARTERY WITHOUT ANGINA PECTORIS: Chronic | Status: ACTIVE | Noted: 2023-07-12

## 2023-10-31 ENCOUNTER — NON-APPOINTMENT (OUTPATIENT)
Age: 78
End: 2023-10-31

## 2023-12-11 ENCOUNTER — APPOINTMENT (OUTPATIENT)
Dept: CARDIOLOGY | Facility: CLINIC | Age: 78
End: 2023-12-11
Payer: MEDICARE

## 2023-12-11 PROCEDURE — 93306 TTE W/DOPPLER COMPLETE: CPT

## 2023-12-26 ENCOUNTER — APPOINTMENT (OUTPATIENT)
Dept: CARDIOLOGY | Facility: CLINIC | Age: 78
End: 2023-12-26
Payer: MEDICARE

## 2023-12-26 VITALS
BODY MASS INDEX: 21.5 KG/M2 | HEART RATE: 62 BPM | SYSTOLIC BLOOD PRESSURE: 145 MMHG | DIASTOLIC BLOOD PRESSURE: 91 MMHG | OXYGEN SATURATION: 98 % | WEIGHT: 172 LBS

## 2023-12-26 PROCEDURE — 99214 OFFICE O/P EST MOD 30 MIN: CPT

## 2024-01-01 RX ORDER — PRAZOSIN HYDROCHLORIDE 5 MG/1
CAPSULE ORAL
Refills: 0 | Status: ACTIVE | COMMUNITY

## 2024-01-01 NOTE — HISTORY OF PRESENT ILLNESS
[FreeTextEntry1] : 78-year-old man Routine follow-up for hypertension atherosclerotic heart disease hyperlipidemia valvular heart disease and right bundle branch block  Mathew complains of fatigue which he attributes to anemia.  Reportedly there has been no evidence of blood loss.  He has required a amputation of his toe due to peripheral vascular disease.  The details of the medical evaluations   operative report etc. are not available for review.   With the administration of prazosin Mathew reports significant improvement in blood pressure levels.

## 2024-01-01 NOTE — DISCUSSION/SUMMARY
[FreeTextEntry1] : Atherosclerotic heart disease Atherosclerotic heart disease is stable. An   Lexiscan sestamibi study was normal. The left ventricle ejection fraction was 63%. In  he was hospitalized following a mechanical fall and had to have elevated troponin level  30 - with questionable T-wave inversions in the lateral leads. He was diagnosed as having a non-STEMI.  A  Lexiscan sestamibi study was normal.  The left ventricular ejection fraction was 65%.  The  echocardiogram showed a left ventricular ejection fraction of  64 %.  In view of the lack of angina, above noninvasive study and clinical course continued medical management is indicated at this time.    I have recommended the following a.  Risk factor modification b   No further cardiac testing for this problem at this time.      Valvular heart  disease/Aortic stenosis/pulmonary hypertension A  echocardiogram revealed severe pulmonary hypertension with a pulmonary artery systolic pressure of 98 mmHg. A  right heart catheterization revealed systemic blood pressure 160/74 mmHg PA 51/17 mmHg pulmonary capillary wedge 16 cardiac output 5.6 The  echocardiogram revealed mild aortic stenosis.  The peak gradient was 33 mmHg mean gradient 17 mmHg and aortic valve area 1.5 cm.  Moderate tricuspid regurgitation was also seen.  Mild-moderate pulmonary hypertension was reflected by pulmonary artery systolic pressure of 50 mmHg.  The left ventricular ejection fraction was 64%.. The present cardiac physical examination is consistent with this degree of aortic stenosis and a euvolemic state New [place] Heart Association class II.    I have recommended the following: Repeat echocardiogram 6-12 months      Hypertension The working diagnosis is severe labile essential hypertension.  .  Blood  pressure levels have been severely labile   .  In view of previous development of severe hyponatremia attributed to diuretic administration., diuretics should be avoided.     Previous treatment with  amlodipine was discontinued for unclear reasons.  Benazepril was discontinued due to a cough.  An evaluation by Dr. Isaacs for secondary causes of hypertension revealed elevated epinephrine levels but no other abnormalities The present medical regimen with the addition of prazosin appears to be effective ..  In my judgment in view concerns for systemic hypotension aggressive administration of antihypertensive agents should  be avoided with permissive treatment for hypertension.  The patient self adjustment of medications is a major influence on management.  I have recommended the followin. Low-salt low-fat low-cholesterol diabetic diet. Exercise to the extent possible 2   Continue the present medical regimen    Hyperlipidemia Hyperlipidemia represents a risk factor for  progressive atherosclerotic heart disease.  The target LDL level in a patient carrying a diagnosis of atherosclerotic heart disease is about 70.    In   the serum cholesterol level was 232  triglycerides 61  HDL 88  and  The ability of high HDL levels to provide protection against cardiovascular events is controversial.  The dose of atorvastatin may be increased if required to obtain optimal levels.. Non-pharmacological therapy, specifically diet and exercise are emphasized as major aspects of treatment.   I have recommended the followin. Low-salt low-fat low-cholesterol diabetic diet. Regular aerobic exercise to the extent possible 2.  Target LDL level to about 70 as discussed above 3.  Laboratory studies including lipid profile through primary care 4.  Increase atorvastatin dose if required to obtain optimal levels as discussed above      Right bundle branch block The presence of a right bundle branch block and left axis deviation are consistent with significant conduction system disease.  Permanent pacemaker implantation is not indicated in the absence of symptomatic bradycardia or high degree AV block  I have  recommended the followin no further cardiac testing for this problem at this time.       The diagnosis, prognosis, risks, options and alternatives were explained at length to the patient. All questions were answered. Issues discussed included   hypertension antihypertensive agents pulmonary hypertension hyperlipidemia  atherosclerotic heart disease valvular heart disease and in particular aortic valve stenosis.  Counseling and/or coordination of care Time was a significant factor for this patient encounter. Total time spent with the patient was 30 minutes. 50% of the time was devoted to counseling and/or coordination of care

## 2024-02-26 ENCOUNTER — NON-APPOINTMENT (OUTPATIENT)
Age: 79
End: 2024-02-26

## 2024-04-01 ENCOUNTER — APPOINTMENT (OUTPATIENT)
Dept: PULMONOLOGY | Facility: CLINIC | Age: 79
End: 2024-04-01
Payer: MEDICARE

## 2024-04-01 PROCEDURE — 99202 OFFICE O/P NEW SF 15 MIN: CPT

## 2024-04-01 NOTE — HISTORY OF PRESENT ILLNESS
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  Patient has significant  psychiatric issue which is being followed by the psychiatrist. He continues to have his sleep problems.  According to the patient he is sleep is disturbed, he is able to fall asleep, throughout the night he is dreaming and wakes up frequently.  Patient also has sleep talking.  He has not acted on any dreams as far as he can tell.  But patient sleeps alone. There are no symptoms suggestive of sleep apnea, there is no snoring or excessive tiredness during the day.

## 2024-04-01 NOTE — PHYSICAL EXAM
[General Appearance - Well Developed] : well developed [General Appearance - Well Nourished] : well nourished [III] : III [Heart Sounds] : normal S1 and S2 [Systolic grade ___/6] : A grade [unfilled]/6 systolic murmur was heard. [] : no respiratory distress [No Focal Deficits] : no focal deficits [Auscultation Breath Sounds / Voice Sounds] : lungs were clear to auscultation bilaterally [Oriented To Time, Place, And Person] : oriented to person, place, and time [Memory Recent] : recent memory was not impaired

## 2024-04-08 ENCOUNTER — RX RENEWAL (OUTPATIENT)
Age: 79
End: 2024-04-08

## 2024-04-08 RX ORDER — LOSARTAN POTASSIUM 50 MG/1
50 TABLET, FILM COATED ORAL TWICE DAILY
Qty: 90 | Refills: 3 | Status: ACTIVE | COMMUNITY
Start: 2023-07-21 | End: 1900-01-01

## 2024-06-18 ENCOUNTER — APPOINTMENT (OUTPATIENT)
Dept: CARDIOLOGY | Facility: CLINIC | Age: 79
End: 2024-06-18
Payer: MEDICARE

## 2024-06-18 PROCEDURE — 93306 TTE W/DOPPLER COMPLETE: CPT

## 2024-06-20 DIAGNOSIS — Z86.79 PERSONAL HISTORY OF OTHER DISEASES OF THE CIRCULATORY SYSTEM: ICD-10-CM

## 2024-06-24 ENCOUNTER — NON-APPOINTMENT (OUTPATIENT)
Age: 79
End: 2024-06-24

## 2024-06-25 ENCOUNTER — APPOINTMENT (OUTPATIENT)
Dept: CARDIOLOGY | Facility: CLINIC | Age: 79
End: 2024-06-25
Payer: MEDICARE

## 2024-06-25 VITALS
HEIGHT: 75 IN | BODY MASS INDEX: 21.26 KG/M2 | HEART RATE: 86 BPM | DIASTOLIC BLOOD PRESSURE: 58 MMHG | SYSTOLIC BLOOD PRESSURE: 151 MMHG | WEIGHT: 171 LBS | OXYGEN SATURATION: 97 %

## 2024-06-25 DIAGNOSIS — I27.20 PULMONARY HYPERTENSION, UNSPECIFIED: ICD-10-CM

## 2024-06-25 DIAGNOSIS — I45.10 UNSPECIFIED RIGHT BUNDLE-BRANCH BLOCK: ICD-10-CM

## 2024-06-25 DIAGNOSIS — I10 ESSENTIAL (PRIMARY) HYPERTENSION: ICD-10-CM

## 2024-06-25 DIAGNOSIS — I38 ENDOCARDITIS, VALVE UNSPECIFIED: ICD-10-CM

## 2024-06-25 DIAGNOSIS — Z86.79 PERSONAL HISTORY OF OTHER DISEASES OF THE CIRCULATORY SYSTEM: ICD-10-CM

## 2024-06-25 DIAGNOSIS — I25.10 ATHEROSCLEROTIC HEART DISEASE OF NATIVE CORONARY ARTERY W/OUT ANGINA PECTORIS: ICD-10-CM

## 2024-06-25 DIAGNOSIS — E78.5 HYPERLIPIDEMIA, UNSPECIFIED: ICD-10-CM

## 2024-06-25 DIAGNOSIS — I35.0 NONRHEUMATIC AORTIC (VALVE) STENOSIS: ICD-10-CM

## 2024-06-25 PROCEDURE — 93000 ELECTROCARDIOGRAM COMPLETE: CPT

## 2024-06-25 PROCEDURE — 99214 OFFICE O/P EST MOD 30 MIN: CPT

## 2024-06-26 ENCOUNTER — NON-APPOINTMENT (OUTPATIENT)
Age: 79
End: 2024-06-26

## 2024-06-26 PROBLEM — E78.5 HYPERLIPIDEMIA: Status: ACTIVE | Noted: 2019-09-24

## 2024-06-26 PROBLEM — I27.20 PULMONARY HYPERTENSION: Status: ACTIVE | Noted: 2023-10-30

## 2024-06-26 PROBLEM — I35.0 AORTIC STENOSIS: Status: ACTIVE | Noted: 2020-06-21

## 2024-06-26 PROBLEM — Z86.79 HISTORY OF HEART VALVE ABNORMALITY: Status: RESOLVED | Noted: 2019-09-24 | Resolved: 2024-06-26

## 2024-06-26 PROBLEM — I10 HYPERTENSION: Status: ACTIVE | Noted: 2019-09-24

## 2024-06-26 PROBLEM — I45.10 RIGHT BUNDLE BRANCH BLOCK (RBBB): Status: ACTIVE | Noted: 2019-09-24

## 2024-06-26 PROBLEM — I38 VALVULAR HEART DISEASE: Status: ACTIVE | Noted: 2019-09-24

## 2024-06-26 PROBLEM — I25.10 ATHEROSCLEROTIC HEART DISEASE: Status: ACTIVE | Noted: 2022-09-25

## 2024-12-31 ENCOUNTER — APPOINTMENT (OUTPATIENT)
Dept: CARDIOLOGY | Facility: CLINIC | Age: 79
End: 2024-12-31